# Patient Record
Sex: MALE | Race: WHITE | Employment: OTHER | URBAN - METROPOLITAN AREA
[De-identification: names, ages, dates, MRNs, and addresses within clinical notes are randomized per-mention and may not be internally consistent; named-entity substitution may affect disease eponyms.]

---

## 2017-01-12 ENCOUNTER — TELEPHONE (OUTPATIENT)
Dept: CARDIOLOGY CLINIC | Age: 72
End: 2017-01-12

## 2017-01-18 ENCOUNTER — OFFICE VISIT (OUTPATIENT)
Dept: CARDIOLOGY CLINIC | Age: 72
End: 2017-01-18

## 2017-01-18 VITALS
HEIGHT: 66 IN | HEART RATE: 100 BPM | DIASTOLIC BLOOD PRESSURE: 72 MMHG | BODY MASS INDEX: 31.66 KG/M2 | WEIGHT: 197 LBS | SYSTOLIC BLOOD PRESSURE: 114 MMHG

## 2017-01-18 DIAGNOSIS — I48.19 PERSISTENT ATRIAL FIBRILLATION (HCC): ICD-10-CM

## 2017-01-18 DIAGNOSIS — Z86.79 S/P ABLATION OF ATRIAL FIBRILLATION: ICD-10-CM

## 2017-01-18 DIAGNOSIS — Z98.890 S/P ABLATION OF ATRIAL FIBRILLATION: ICD-10-CM

## 2017-01-18 PROCEDURE — 99213 OFFICE O/P EST LOW 20 MIN: CPT | Performed by: INTERNAL MEDICINE

## 2017-01-18 RX ORDER — DILTIAZEM HYDROCHLORIDE 360 MG/1
CAPSULE, EXTENDED RELEASE ORAL
COMMUNITY
Start: 2016-12-28 | End: 2017-10-25 | Stop reason: SDUPTHER

## 2017-02-07 ENCOUNTER — TELEPHONE (OUTPATIENT)
Dept: CARDIOLOGY CLINIC | Age: 72
End: 2017-02-07

## 2017-02-08 DIAGNOSIS — Z98.890 S/P ABLATION OF ATRIAL FIBRILLATION: ICD-10-CM

## 2017-02-08 DIAGNOSIS — Z86.79 S/P ABLATION OF ATRIAL FIBRILLATION: ICD-10-CM

## 2017-02-08 DIAGNOSIS — I48.19 PERSISTENT ATRIAL FIBRILLATION (HCC): ICD-10-CM

## 2017-02-09 ENCOUNTER — TELEPHONE (OUTPATIENT)
Dept: CARDIOLOGY CLINIC | Age: 72
End: 2017-02-09

## 2017-02-09 DIAGNOSIS — Z98.890 S/P ABLATION OF ATRIAL FIBRILLATION: ICD-10-CM

## 2017-02-09 DIAGNOSIS — I48.19 PERSISTENT ATRIAL FIBRILLATION (HCC): ICD-10-CM

## 2017-02-09 DIAGNOSIS — Z86.79 S/P ABLATION OF ATRIAL FIBRILLATION: ICD-10-CM

## 2017-03-17 RX ORDER — PRAVASTATIN SODIUM 20 MG
20 TABLET ORAL DAILY
Qty: 30 TABLET | Refills: 6 | Status: SHIPPED | OUTPATIENT
Start: 2017-03-17 | End: 2017-11-10 | Stop reason: SDUPTHER

## 2017-03-17 RX ORDER — LISINOPRIL 5 MG/1
5 TABLET ORAL DAILY
Qty: 30 TABLET | Refills: 6 | Status: SHIPPED | OUTPATIENT
Start: 2017-03-17 | End: 2019-04-15 | Stop reason: ALTCHOICE

## 2017-04-04 DIAGNOSIS — I48.19 PERSISTENT ATRIAL FIBRILLATION (HCC): ICD-10-CM

## 2017-04-04 DIAGNOSIS — Z98.890 S/P ABLATION OF ATRIAL FIBRILLATION: ICD-10-CM

## 2017-04-04 DIAGNOSIS — Z86.79 S/P ABLATION OF ATRIAL FIBRILLATION: ICD-10-CM

## 2017-05-03 ENCOUNTER — TELEPHONE (OUTPATIENT)
Dept: CARDIOLOGY CLINIC | Age: 72
End: 2017-05-03

## 2017-05-03 DIAGNOSIS — Z98.890 S/P ABLATION OF ATRIAL FIBRILLATION: ICD-10-CM

## 2017-05-03 DIAGNOSIS — Z86.79 S/P ABLATION OF ATRIAL FIBRILLATION: ICD-10-CM

## 2017-05-03 DIAGNOSIS — I48.19 PERSISTENT ATRIAL FIBRILLATION (HCC): ICD-10-CM

## 2017-05-31 DIAGNOSIS — Z98.890 S/P ABLATION OF ATRIAL FIBRILLATION: ICD-10-CM

## 2017-05-31 DIAGNOSIS — Z86.79 S/P ABLATION OF ATRIAL FIBRILLATION: ICD-10-CM

## 2017-05-31 DIAGNOSIS — I48.19 PERSISTENT ATRIAL FIBRILLATION (HCC): ICD-10-CM

## 2017-06-28 ENCOUNTER — TELEPHONE (OUTPATIENT)
Dept: CARDIOLOGY CLINIC | Age: 72
End: 2017-06-28

## 2017-06-28 DIAGNOSIS — I48.19 PERSISTENT ATRIAL FIBRILLATION (HCC): ICD-10-CM

## 2017-06-28 DIAGNOSIS — Z98.890 S/P ABLATION OF ATRIAL FIBRILLATION: ICD-10-CM

## 2017-06-28 DIAGNOSIS — Z86.79 S/P ABLATION OF ATRIAL FIBRILLATION: ICD-10-CM

## 2017-07-27 DIAGNOSIS — Z98.890 S/P ABLATION OF ATRIAL FIBRILLATION: ICD-10-CM

## 2017-07-27 DIAGNOSIS — I48.19 PERSISTENT ATRIAL FIBRILLATION (HCC): ICD-10-CM

## 2017-07-27 DIAGNOSIS — Z86.79 S/P ABLATION OF ATRIAL FIBRILLATION: ICD-10-CM

## 2017-08-23 ENCOUNTER — TELEPHONE (OUTPATIENT)
Dept: CARDIOLOGY CLINIC | Age: 72
End: 2017-08-23

## 2017-08-23 DIAGNOSIS — Z98.890 S/P ABLATION OF ATRIAL FIBRILLATION: ICD-10-CM

## 2017-08-23 DIAGNOSIS — I48.19 PERSISTENT ATRIAL FIBRILLATION (HCC): ICD-10-CM

## 2017-08-23 DIAGNOSIS — Z86.79 S/P ABLATION OF ATRIAL FIBRILLATION: ICD-10-CM

## 2017-09-22 ENCOUNTER — TELEPHONE (OUTPATIENT)
Dept: CARDIOLOGY CLINIC | Age: 72
End: 2017-09-22

## 2017-09-22 DIAGNOSIS — Z86.79 S/P ABLATION OF ATRIAL FIBRILLATION: ICD-10-CM

## 2017-09-22 DIAGNOSIS — Z98.890 S/P ABLATION OF ATRIAL FIBRILLATION: ICD-10-CM

## 2017-09-22 DIAGNOSIS — I48.19 PERSISTENT ATRIAL FIBRILLATION (HCC): ICD-10-CM

## 2017-10-25 ENCOUNTER — OFFICE VISIT (OUTPATIENT)
Dept: CARDIOLOGY CLINIC | Age: 72
End: 2017-10-25

## 2017-10-25 VITALS
SYSTOLIC BLOOD PRESSURE: 132 MMHG | DIASTOLIC BLOOD PRESSURE: 82 MMHG | BODY MASS INDEX: 30.63 KG/M2 | HEIGHT: 66 IN | WEIGHT: 190.6 LBS | HEART RATE: 88 BPM

## 2017-10-25 DIAGNOSIS — Z98.890 S/P ABLATION OF ATRIAL FIBRILLATION: Primary | ICD-10-CM

## 2017-10-25 DIAGNOSIS — I48.19 PERSISTENT ATRIAL FIBRILLATION (HCC): ICD-10-CM

## 2017-10-25 DIAGNOSIS — Z98.890 S/P ABLATION OF ATRIAL FIBRILLATION: ICD-10-CM

## 2017-10-25 DIAGNOSIS — Z86.79 S/P ABLATION OF ATRIAL FIBRILLATION: ICD-10-CM

## 2017-10-25 DIAGNOSIS — Z86.79 S/P ABLATION OF ATRIAL FIBRILLATION: Primary | ICD-10-CM

## 2017-10-25 PROCEDURE — 99213 OFFICE O/P EST LOW 20 MIN: CPT | Performed by: INTERNAL MEDICINE

## 2017-10-25 NOTE — PROGRESS NOTES
CARDIOLOGY NOTE      10/25/2017    RE: Paulino Curry  (1945)                               TO:  Dr. Reed Roth MD      Thank you for involving me in taking care of your  patient Paulino Curry, who is a  67y.o. year old      male with past medical  history of  HTN, hyperlipidimea, SP ablation for  afib  is  seen today Patient  during this  visit has no cardiac complains. Has RLE varicose veins. .      Vitals:    10/25/17 1534   BP: 132/82   Pulse: 88       Current Outpatient Prescriptions   Medication Sig Dispense Refill    apixaban (ELIQUIS) 5 MG TABS tablet Take one tablet twice daily 56 tablet 0    pravastatin (PRAVACHOL) 20 MG tablet Take 1 tablet by mouth daily 30 tablet 6    lisinopril (PRINIVIL;ZESTRIL) 5 MG tablet Take 1 tablet by mouth daily 30 tablet 6    traMADol-acetaminophen (ULTRACET) 37.5-325 MG per tablet 1 tablet every 4 hours as needed       diltiazem (TIAZAC) 360 MG extended release capsule Take 1 capsule by mouth daily 30 capsule 11    omeprazole (PRILOSEC) 40 MG capsule Take 40 mg by mouth daily      ferrous sulfate 325 (65 FE) MG tablet Take 325 mg by mouth daily (with breakfast)      gabapentin (NEURONTIN) 100 MG capsule Take 100 mg by mouth nightly as needed       UNABLE TO FIND Venancio B-150      diclofenac sodium 1 % GEL Apply 2 g topically 2 times daily      hydrochlorothiazide (MICROZIDE) 12.5 MG capsule Take 12.5 mg by mouth daily.  multivitamin (THERAGRAN) per tablet Take 1 tablet by mouth daily.  vitamin E 400 UNIT capsule Take 400 Units by mouth daily.  vitamin B-12 (CYANOCOBALAMIN) 100 MCG tablet Take 50 mcg by mouth daily.  celecoxib (CELEBREX) 200 MG capsule Take 200 mg by mouth 2 times daily.  levothyroxine (SYNTHROID) 100 MCG tablet Take 100 mcg by mouth daily.  aspirin 81 MG chewable tablet Take 81 mg by mouth daily. No current facility-administered medications for this visit.       Allergies: no apparent distress. Head unremarkable  Eyes  Not injected conjunctiva  ENT  normal mucosa  Neck - Supple. No jugular venous distention noted. No carotid bruits. Cardiovascular  Normal S1 and S2 without obvious murmur or gallop. Extremities - No cyanosis, clubbing, or significant edema. Has RLE varricose veins    Pulmonary  No respiratory distress. No wheezes or rales. Pulses: Bilateral radial and pedal pulses normal  Abdomen  no tenderness  Musculoskeletal  normal strength  Neurologic    There are  no gross focal neurologic abnormalities. Skin-  No rash  Affect; normal mood    DATA:  Lab Results   Component Value Date    TROPONINI 0.050 03/23/2012     BNP:  No results found for: BNP  PT/INR:  No results found for: PTINR  No results found for: LABA1C  No results found for: CHOL, TRIG, HDL, LDLCALC, LDLDIRECT  Lab Results   Component Value Date    ALT 23 06/23/2012    AST 30 06/23/2012     TSH:  No results found for: TSH      QUALITY MEASURES:  CAD:  No   CHOL LOWERING:  No- if No Why  ANTIPLATELET:  No - if No why  BETA BLOCKER    no  IF  NO WHY  SMOKING HISTORY no COUNSELLED no  ATRIAL FIBRILLATIONno ANTICOAG: no    Assessment/ Plan:     Patient seen , interviewed and examined      -  Hypertension: Patients blood pressure is stable. Patient is advised about low sodium diet. Present medical regimen will not be changed. -  LIPID MANAGEMENT:  Available lipid  lab data reviewed  and patient was given dietary advice. NCEP- ATP III guidelines reviewed with patient. -   Changes  in medicines made:  No             - A fib    SP ablation    On meds    - RLE varicose , surg cons offered

## 2017-11-10 RX ORDER — PRAVASTATIN SODIUM 20 MG
20 TABLET ORAL DAILY
Qty: 30 TABLET | Refills: 5 | Status: SHIPPED | OUTPATIENT
Start: 2017-11-10 | End: 2018-05-09 | Stop reason: SDUPTHER

## 2017-11-20 ENCOUNTER — TELEPHONE (OUTPATIENT)
Dept: CARDIOLOGY CLINIC | Age: 72
End: 2017-11-20

## 2017-11-20 DIAGNOSIS — Z86.79 S/P ABLATION OF ATRIAL FIBRILLATION: ICD-10-CM

## 2017-11-20 DIAGNOSIS — I48.19 PERSISTENT ATRIAL FIBRILLATION (HCC): ICD-10-CM

## 2017-11-20 DIAGNOSIS — Z98.890 S/P ABLATION OF ATRIAL FIBRILLATION: ICD-10-CM

## 2017-12-07 ENCOUNTER — TELEPHONE (OUTPATIENT)
Dept: CARDIOLOGY CLINIC | Age: 72
End: 2017-12-07

## 2017-12-07 NOTE — TELEPHONE ENCOUNTER
Kayla needs pt to be off eliquis for procedure for pt . Procedure scheduled 12/11. Please call kayla or fax letter. She will fax request to our office.

## 2017-12-07 NOTE — TELEPHONE ENCOUNTER
Patient called checking on holding eliquis prior to procedure. Advised patient that the doctor has called to check on holding eliquis.

## 2017-12-08 NOTE — TELEPHONE ENCOUNTER
Patient advised that per Dr. Bonnie Jamison has OK'd him to hold his Eliquis for 48 hours for EGD. Left message on voicemail of Andrew at  to advise.

## 2017-12-21 RX ORDER — DILTIAZEM HYDROCHLORIDE 360 MG/1
360 CAPSULE, EXTENDED RELEASE ORAL DAILY
Qty: 30 CAPSULE | Refills: 10 | Status: SHIPPED | OUTPATIENT
Start: 2017-12-21 | End: 2018-11-14 | Stop reason: SDUPTHER

## 2017-12-27 ENCOUNTER — TELEPHONE (OUTPATIENT)
Dept: CARDIOLOGY CLINIC | Age: 72
End: 2017-12-27

## 2017-12-28 DIAGNOSIS — Z86.79 S/P ABLATION OF ATRIAL FIBRILLATION: ICD-10-CM

## 2017-12-28 DIAGNOSIS — I48.19 PERSISTENT ATRIAL FIBRILLATION (HCC): ICD-10-CM

## 2017-12-28 DIAGNOSIS — Z98.890 S/P ABLATION OF ATRIAL FIBRILLATION: ICD-10-CM

## 2018-01-24 ENCOUNTER — TELEPHONE (OUTPATIENT)
Dept: CARDIOLOGY CLINIC | Age: 73
End: 2018-01-24

## 2018-01-24 DIAGNOSIS — I48.19 PERSISTENT ATRIAL FIBRILLATION (HCC): ICD-10-CM

## 2018-01-24 DIAGNOSIS — Z86.79 S/P ABLATION OF ATRIAL FIBRILLATION: ICD-10-CM

## 2018-01-24 DIAGNOSIS — Z98.890 S/P ABLATION OF ATRIAL FIBRILLATION: ICD-10-CM

## 2018-02-21 DIAGNOSIS — I48.19 PERSISTENT ATRIAL FIBRILLATION (HCC): ICD-10-CM

## 2018-02-21 DIAGNOSIS — Z98.890 S/P ABLATION OF ATRIAL FIBRILLATION: ICD-10-CM

## 2018-02-21 DIAGNOSIS — Z86.79 S/P ABLATION OF ATRIAL FIBRILLATION: ICD-10-CM

## 2018-03-26 ENCOUNTER — TELEPHONE (OUTPATIENT)
Dept: CARDIOLOGY CLINIC | Age: 73
End: 2018-03-26

## 2018-03-26 DIAGNOSIS — I48.19 PERSISTENT ATRIAL FIBRILLATION (HCC): ICD-10-CM

## 2018-03-26 DIAGNOSIS — Z86.79 S/P ABLATION OF ATRIAL FIBRILLATION: ICD-10-CM

## 2018-03-26 DIAGNOSIS — Z98.890 S/P ABLATION OF ATRIAL FIBRILLATION: ICD-10-CM

## 2018-04-17 DIAGNOSIS — Z86.79 S/P ABLATION OF ATRIAL FIBRILLATION: ICD-10-CM

## 2018-04-17 DIAGNOSIS — Z98.890 S/P ABLATION OF ATRIAL FIBRILLATION: ICD-10-CM

## 2018-04-17 DIAGNOSIS — I48.19 PERSISTENT ATRIAL FIBRILLATION (HCC): ICD-10-CM

## 2018-05-09 RX ORDER — PRAVASTATIN SODIUM 20 MG
20 TABLET ORAL DAILY
Qty: 30 TABLET | Refills: 6 | Status: SHIPPED | OUTPATIENT
Start: 2018-05-09 | End: 2018-12-11 | Stop reason: SDUPTHER

## 2018-05-17 DIAGNOSIS — Z86.79 S/P ABLATION OF ATRIAL FIBRILLATION: ICD-10-CM

## 2018-05-17 DIAGNOSIS — Z98.890 S/P ABLATION OF ATRIAL FIBRILLATION: ICD-10-CM

## 2018-05-17 DIAGNOSIS — I48.19 PERSISTENT ATRIAL FIBRILLATION (HCC): ICD-10-CM

## 2018-06-14 DIAGNOSIS — Z86.79 S/P ABLATION OF ATRIAL FIBRILLATION: ICD-10-CM

## 2018-06-14 DIAGNOSIS — Z98.890 S/P ABLATION OF ATRIAL FIBRILLATION: ICD-10-CM

## 2018-06-14 DIAGNOSIS — I48.19 PERSISTENT ATRIAL FIBRILLATION (HCC): ICD-10-CM

## 2018-07-10 ENCOUNTER — TELEPHONE (OUTPATIENT)
Dept: CARDIOLOGY CLINIC | Age: 73
End: 2018-07-10

## 2018-07-10 NOTE — TELEPHONE ENCOUNTER
Patient calling for eliquis 5 mg samples. Patient states he has several days worth left. Patient will  7/11/18 in afternoon unless he hears differently from office.

## 2018-07-11 DIAGNOSIS — I48.19 PERSISTENT ATRIAL FIBRILLATION (HCC): ICD-10-CM

## 2018-07-11 DIAGNOSIS — Z98.890 S/P ABLATION OF ATRIAL FIBRILLATION: ICD-10-CM

## 2018-07-11 DIAGNOSIS — Z86.79 S/P ABLATION OF ATRIAL FIBRILLATION: ICD-10-CM

## 2018-07-24 ENCOUNTER — OFFICE VISIT (OUTPATIENT)
Dept: CARDIOLOGY CLINIC | Age: 73
End: 2018-07-24

## 2018-07-24 VITALS
DIASTOLIC BLOOD PRESSURE: 78 MMHG | WEIGHT: 184.4 LBS | SYSTOLIC BLOOD PRESSURE: 124 MMHG | RESPIRATION RATE: 16 BRPM | HEART RATE: 78 BPM | BODY MASS INDEX: 29.76 KG/M2 | OXYGEN SATURATION: 91 %

## 2018-07-24 DIAGNOSIS — E78.5 HYPERLIPIDEMIA, UNSPECIFIED HYPERLIPIDEMIA TYPE: ICD-10-CM

## 2018-07-24 DIAGNOSIS — I48.19 PERSISTENT ATRIAL FIBRILLATION (HCC): Primary | ICD-10-CM

## 2018-07-24 DIAGNOSIS — I72.9 ANEURYSM (HCC): ICD-10-CM

## 2018-07-24 PROCEDURE — 99214 OFFICE O/P EST MOD 30 MIN: CPT | Performed by: NURSE PRACTITIONER

## 2018-07-24 NOTE — PROGRESS NOTES
CARDIOLOGY  NOTE      7/24/2018    RE: Hiren Patrick  (1945)                               TO:  Dr. Ron Miles MD      Thank you for involving me in taking care of your  patient Hiren Patrick, who is a  75y.o. year old male with past medical  history of  At fib,s/p ablation  hyperlipidemia and aneurysm. He is seen today for follow up. He during this visit denies any complaints. Vitals:    07/24/18 1409   BP: 124/78   Pulse: 78   Resp: 16   SpO2: 91%       Current Outpatient Prescriptions   Medication Sig Dispense Refill    apixaban (ELIQUIS) 5 MG TABS tablet Take one tablet twice daily 56 tablet 0    pravastatin (PRAVACHOL) 20 MG tablet Take 1 tablet by mouth daily 30 tablet 6    diltiazem (CARDIZEM CD) 360 MG extended release capsule Take 1 capsule by mouth daily 30 capsule 10    lisinopril (PRINIVIL;ZESTRIL) 5 MG tablet Take 1 tablet by mouth daily 30 tablet 6    traMADol-acetaminophen (ULTRACET) 37.5-325 MG per tablet 1 tablet every 4 hours as needed       omeprazole (PRILOSEC) 40 MG capsule Take 40 mg by mouth daily      ferrous sulfate 325 (65 FE) MG tablet Take 325 mg by mouth daily (with breakfast)      gabapentin (NEURONTIN) 100 MG capsule Take 100 mg by mouth nightly as needed       UNABLE TO FIND Venancio B-150      diclofenac sodium 1 % GEL Apply 2 g topically 2 times daily      hydrochlorothiazide (MICROZIDE) 12.5 MG capsule Take 12.5 mg by mouth daily.  multivitamin (THERAGRAN) per tablet Take 1 tablet by mouth daily.  vitamin E 400 UNIT capsule Take 400 Units by mouth daily.  vitamin B-12 (CYANOCOBALAMIN) 100 MCG tablet Take 50 mcg by mouth daily.  celecoxib (CELEBREX) 200 MG capsule Take 200 mg by mouth 2 times daily.  levothyroxine (SYNTHROID) 100 MCG tablet Take 100 mcg by mouth daily.  aspirin 81 MG chewable tablet Take 81 mg by mouth daily.          No current facility-administered medications for this visit. Allergies: Codeine and Vicodin [hydrocodone-acetaminophen]  Past Medical History:   Diagnosis Date    Aneurysm ([address]     12 YEARS AGO, NON SURGICAL    Arthritis     H/O 24 hour EKG monitoring 05-    PRED. RHYTHM SR W/INFREQUENT VENT. AND SUPRAVENT ECTOPY W/SHORT RUNS OF SVT. LONG RUN 10 BEATS.    H/O cardiovascular stress test 05-    [facility] [place]) NORMAL. EF 56%.  H/O cardiovascular stress test 6/6/2016    lexiscan-normal,EF51%    H/O echocardiogram 05-    EF 55%. MILD CONCENTRIC LEFT VENT. HYPERTROPHY. MILD AORTIC VALVE CALCIFICATION. MILD VALVULAR AORTIC STENOSIS. MOD. AORTIC REGURG.  MILD AORTIC ROOT DILATION.   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    Thyroid disease     HYPOTHYROIDISM     Past Surgical History:   Procedure Laterality Date    ATRIAL ABLATION SURGERY N/A 08/16/2016    atrial fibrillation ablation    BRAIN ANEURYSM SURGERY      has coil    CATARACT REMOVAL  2012    HERNIA REPAIR      LEFT    KNEE SURGERY  1977    knee  left    KNEE SURGERY      VITRECTOMY  05-    LEFT EYE      Family History   Problem Relation Age of Onset    High Blood Pressure Mother      Social History   Substance Use Topics    Smoking status: Never Smoker    Smokeless tobacco: Never Used      Comment: reviewed    Alcohol use No          Review of systems:  HEENT: Neg  Card:neg   GI;Neg  : Neg  Neuro: Neg  Psych: Neg  Derm: Neg  MS; Neg  All: Documented  Constitutional: Neg    Objective:      Physical Exam:  /78   Pulse 78   Resp 16   Wt 184 lb 6.4 oz (83.6 kg)   SpO2 91%   BMI 29.76 kg/m²   Wt Readings from Last 3 Encounters:   07/24/18 184 lb 6.4 oz (83.6 kg)   10/25/17 190 lb 9.6 oz (86.5 kg)   01/18/17 197 lb (89.4 kg)     Body mass index is 29.76 kg/m². GENERAL - Alert, oriented, pleasant, in no apparent distress. Head unremarkable  Eyes  Not injected conjunctiva  ENT  normal mucosa  Neck - Supple. No jugular venous distention noted. No carotid bruits. Cardiovascular  Normal S1 and S2 3/6 systolic murmur  murmur or gallop. Extremities - No cyanosis, clubbing, or significant edema. Large varicosities   Pulmonary  No respiratory distress. No wheezes or rales. Pulses: Bilateral radial and pedal pulses normal  Abdomen  no tenderness  Musculoskeletal  normal strength  Neurologic    There are  no gross focal neurologic abnormalities. Skin-  No rash  Affect; normal mood    DATA:  Lab Results   Component Value Date    TROPONINI 0.050 03/23/2012     BNP:  No results found for: BNP  PT/INR:  No results found for: PTINR  No results found for: LABA1C  No results found for: CHOL, TRIG, HDL, LDLCALC, LDLDIRECT  Lab Results   Component Value Date    ALT 23 06/23/2012    AST 30 06/23/2012     TSH:  No results found for: TSH        Assessment/ Plan:     Patient seen , interviewed and examined     Atrial fib- s/p ablation  - on eliquis -cardizem  Rate controlled rhythm regular. hypothyroid taking synthroid  Followed with pcp   Hyperlipidemia- on pravastatin   Labs with PCP  given dietary advice. NCEP- ATP III guidelines reviewed with patient.   HTN- stable  Cont with present meds   Low salt diet       F/u 9 mo   Cont present management  No testing needed at this time

## 2018-08-13 DIAGNOSIS — Z86.79 S/P ABLATION OF ATRIAL FIBRILLATION: ICD-10-CM

## 2018-08-13 DIAGNOSIS — Z98.890 S/P ABLATION OF ATRIAL FIBRILLATION: ICD-10-CM

## 2018-08-13 DIAGNOSIS — I48.19 PERSISTENT ATRIAL FIBRILLATION (HCC): ICD-10-CM

## 2018-10-03 DIAGNOSIS — I48.19 PERSISTENT ATRIAL FIBRILLATION (HCC): ICD-10-CM

## 2018-10-03 DIAGNOSIS — Z98.890 S/P ABLATION OF ATRIAL FIBRILLATION: ICD-10-CM

## 2018-10-03 DIAGNOSIS — Z86.79 S/P ABLATION OF ATRIAL FIBRILLATION: ICD-10-CM

## 2018-10-03 NOTE — TELEPHONE ENCOUNTER
Patient called requesting 5 mg samples of Eliquis, I advised patient that if available samples should be ready for  by tomorrow afternoon, please call with any problems ph# [phone].

## 2018-10-31 DIAGNOSIS — I48.19 PERSISTENT ATRIAL FIBRILLATION (HCC): ICD-10-CM

## 2018-10-31 DIAGNOSIS — Z86.79 S/P ABLATION OF ATRIAL FIBRILLATION: ICD-10-CM

## 2018-10-31 DIAGNOSIS — Z98.890 S/P ABLATION OF ATRIAL FIBRILLATION: ICD-10-CM

## 2018-11-14 RX ORDER — DILTIAZEM HYDROCHLORIDE 360 MG/1
CAPSULE, EXTENDED RELEASE ORAL
Qty: 30 CAPSULE | Refills: 9 | Status: SHIPPED | OUTPATIENT
Start: 2018-11-14 | End: 2019-09-16 | Stop reason: SDUPTHER

## 2018-11-28 ENCOUNTER — TELEPHONE (OUTPATIENT)
Dept: CARDIOLOGY CLINIC | Age: 73
End: 2018-11-28

## 2018-11-28 DIAGNOSIS — I48.19 PERSISTENT ATRIAL FIBRILLATION (HCC): ICD-10-CM

## 2018-11-28 DIAGNOSIS — Z86.79 S/P ABLATION OF ATRIAL FIBRILLATION: ICD-10-CM

## 2018-11-28 DIAGNOSIS — Z98.890 S/P ABLATION OF ATRIAL FIBRILLATION: ICD-10-CM

## 2018-12-11 RX ORDER — PRAVASTATIN SODIUM 20 MG
20 TABLET ORAL DAILY
Qty: 30 TABLET | Refills: 5 | Status: SHIPPED | OUTPATIENT
Start: 2018-12-11 | End: 2019-06-19 | Stop reason: SDUPTHER

## 2018-12-28 DIAGNOSIS — I48.19 PERSISTENT ATRIAL FIBRILLATION (HCC): ICD-10-CM

## 2018-12-28 DIAGNOSIS — Z86.79 S/P ABLATION OF ATRIAL FIBRILLATION: ICD-10-CM

## 2018-12-28 DIAGNOSIS — Z98.890 S/P ABLATION OF ATRIAL FIBRILLATION: ICD-10-CM

## 2019-01-24 DIAGNOSIS — I48.19 PERSISTENT ATRIAL FIBRILLATION (HCC): ICD-10-CM

## 2019-01-24 DIAGNOSIS — Z98.890 S/P ABLATION OF ATRIAL FIBRILLATION: ICD-10-CM

## 2019-01-24 DIAGNOSIS — Z86.79 S/P ABLATION OF ATRIAL FIBRILLATION: ICD-10-CM

## 2019-02-21 DIAGNOSIS — Z98.890 S/P ABLATION OF ATRIAL FIBRILLATION: ICD-10-CM

## 2019-02-21 DIAGNOSIS — I48.19 PERSISTENT ATRIAL FIBRILLATION (HCC): ICD-10-CM

## 2019-02-21 DIAGNOSIS — Z86.79 S/P ABLATION OF ATRIAL FIBRILLATION: ICD-10-CM

## 2019-03-20 ENCOUNTER — TELEPHONE (OUTPATIENT)
Dept: CARDIOLOGY CLINIC | Age: 74
End: 2019-03-20

## 2019-03-20 DIAGNOSIS — I48.19 PERSISTENT ATRIAL FIBRILLATION (HCC): ICD-10-CM

## 2019-03-20 DIAGNOSIS — Z86.79 S/P ABLATION OF ATRIAL FIBRILLATION: ICD-10-CM

## 2019-03-20 DIAGNOSIS — Z98.890 S/P ABLATION OF ATRIAL FIBRILLATION: ICD-10-CM

## 2019-04-09 LAB
ALBUMIN SERPL-MCNC: 3.7 G/DL
ALP BLD-CCNC: 78 U/L
ALT SERPL-CCNC: 16 U/L
ANION GAP SERPL CALCULATED.3IONS-SCNC: NORMAL MMOL/L
AST SERPL-CCNC: 24 U/L
BASOPHILS ABSOLUTE: 0.1 /ΜL
BASOPHILS RELATIVE PERCENT: 0.8 %
BILIRUB SERPL-MCNC: 0.3 MG/DL (ref 0.1–1.4)
BUN BLDV-MCNC: 37 MG/DL
CALCIUM SERPL-MCNC: 9.2 MG/DL
CHLORIDE BLD-SCNC: 107 MMOL/L
CHOLESTEROL, TOTAL: 103 MG/DL
CHOLESTEROL/HDL RATIO: NORMAL
CO2: 22 MMOL/L
CREAT SERPL-MCNC: 1 MG/DL
EOSINOPHILS ABSOLUTE: 0.4 /ΜL
EOSINOPHILS RELATIVE PERCENT: 5.9 %
GFR CALCULATED: NORMAL
GLUCOSE BLD-MCNC: 125 MG/DL
HCT VFR BLD CALC: 36.2 % (ref 41–53)
HDLC SERPL-MCNC: 37 MG/DL (ref 35–70)
HEMOGLOBIN: 12.4 G/DL (ref 13.5–17.5)
LDL CHOLESTEROL CALCULATED: 50 MG/DL (ref 0–160)
LYMPHOCYTES ABSOLUTE: 1.1 /ΜL
LYMPHOCYTES RELATIVE PERCENT: 15.1 %
MCH RBC QN AUTO: ABNORMAL PG
MCHC RBC AUTO-ENTMCNC: ABNORMAL G/DL
MCV RBC AUTO: ABNORMAL FL
MONOCYTES ABSOLUTE: 1.2 /ΜL
MONOCYTES RELATIVE PERCENT: 16.1 %
NEUTROPHILS ABSOLUTE: 4.5 /ΜL
NEUTROPHILS RELATIVE PERCENT: 62.1 %
PLATELET # BLD: 297 K/ΜL
PMV BLD AUTO: ABNORMAL FL
POTASSIUM SERPL-SCNC: 5 MMOL/L
RBC # BLD: 3.75 10^6/ΜL
SODIUM BLD-SCNC: 143 MMOL/L
TOTAL PROTEIN: 6.2
TRIGL SERPL-MCNC: 82 MG/DL
TSH SERPL DL<=0.05 MIU/L-ACNC: 0.72 UIU/ML
VLDLC SERPL CALC-MCNC: 16 MG/DL
WBC # BLD: 7.2 10^3/ML

## 2019-04-15 ENCOUNTER — TELEPHONE (OUTPATIENT)
Dept: CARDIOLOGY CLINIC | Age: 74
End: 2019-04-15

## 2019-04-15 ENCOUNTER — OFFICE VISIT (OUTPATIENT)
Dept: CARDIOLOGY CLINIC | Age: 74
End: 2019-04-15
Payer: MEDICARE

## 2019-04-15 VITALS
HEIGHT: 66 IN | BODY MASS INDEX: 28.83 KG/M2 | WEIGHT: 179.4 LBS | DIASTOLIC BLOOD PRESSURE: 60 MMHG | SYSTOLIC BLOOD PRESSURE: 114 MMHG | HEART RATE: 140 BPM

## 2019-04-15 DIAGNOSIS — R06.02 SHORTNESS OF BREATH: Primary | ICD-10-CM

## 2019-04-15 DIAGNOSIS — I48.19 PERSISTENT ATRIAL FIBRILLATION (HCC): ICD-10-CM

## 2019-04-15 DIAGNOSIS — E78.5 HYPERLIPIDEMIA, UNSPECIFIED HYPERLIPIDEMIA TYPE: ICD-10-CM

## 2019-04-15 PROCEDURE — 93000 ELECTROCARDIOGRAM COMPLETE: CPT | Performed by: NURSE PRACTITIONER

## 2019-04-15 PROCEDURE — 99213 OFFICE O/P EST LOW 20 MIN: CPT | Performed by: NURSE PRACTITIONER

## 2019-04-15 RX ORDER — METOPROLOL SUCCINATE 25 MG/1
12.5 TABLET, EXTENDED RELEASE ORAL DAILY
Qty: 15 TABLET | Refills: 0 | Status: SHIPPED | OUTPATIENT
Start: 2019-04-15 | End: 2019-05-22 | Stop reason: ALTCHOICE

## 2019-04-15 NOTE — PROGRESS NOTES
[facility] PHYSICAL REHABILITATION [place]  [address], [address]  Phone: [phone]    Fax [phone]                  Brian Blum MD, Deneen Johnson MD, [address], MD, Ema Savana, MD Lizbeth Tapia, MD Diana Adair, NAVNEET Russell, NAVNEET Escobar    CARDIOLOGY  NOTE      4/15/2019    RE: Moreno Trevino  (1945)                               TO:  Dr. Sandrita Nolasco MD  The primary cardiologist is Dr. Lakeshia Dennison    CC:  Patient denies all of the following:  Chest Pain  Palpitations  Shortness of Breath  Edema  Dizziness  Syncope      HPI: Thank you for involving me in taking care of your patient Moreno Trevino, who is a  75y.o. year old male with a history of ***. Vitals:    04/15/19 1358   BP: 114/60   Pulse: 140       Current Outpatient Medications   Medication Sig Dispense Refill    metoprolol succinate (TOPROL XL) 25 MG extended release tablet Take 0.5 tablets by mouth daily 15 tablet 0    apixaban (ELIQUIS) 5 MG TABS tablet Take one tablet twice daily 56 tablet 0    pravastatin (PRAVACHOL) 20 MG tablet Take 1 tablet by mouth daily 30 tablet 5    diltiazem (CARDIZEM CD) 360 MG extended release capsule TAKE ONE (1) CAPSULE BY MOUTH ONCE DAILY 30 capsule 9    traMADol-acetaminophen (ULTRACET) 37.5-325 MG per tablet 1 tablet every 4 hours as needed       omeprazole (PRILOSEC) 40 MG capsule Take 40 mg by mouth daily      ferrous sulfate 325 (65 FE) MG tablet Take 325 mg by mouth daily (with breakfast)      gabapentin (NEURONTIN) 100 MG capsule Take 100 mg by mouth nightly as needed       UNABLE TO FIND Venancio B-150      diclofenac sodium 1 % GEL Apply 2 g topically 2 times daily      multivitamin (THERAGRAN) per tablet Take 1 tablet by mouth daily.  vitamin E 400 UNIT capsule Take 400 Units by mouth daily.  vitamin B-12 (CYANOCOBALAMIN) 100 MCG tablet Take 50 mcg by mouth daily.  celecoxib (CELEBREX) 200 MG capsule Take 200 mg by mouth 2 times daily.  levothyroxine (SYNTHROID) 100 MCG tablet Take 100 mcg by mouth daily.  aspirin 81 MG chewable tablet Take 81 mg by mouth daily.  lisinopril (PRINIVIL;ZESTRIL) 5 MG tablet Take 1 tablet by mouth daily 30 tablet 6    hydrochlorothiazide (MICROZIDE) 12.5 MG capsule Take 12.5 mg by mouth daily. No current facility-administered medications for this visit. Allergies: Codeine and Vicodin [hydrocodone-acetaminophen]  Past Medical History:   Diagnosis Date    Aneurysm ([facility] [address]     12 YEARS AGO, NON SURGICAL    Arthritis     H/O 24 hour EKG monitoring 05-    PRED. RHYTHM SR W/INFREQUENT VENT. AND SUPRAVENT ECTOPY W/SHORT RUNS OF SVT. LONG RUN 10 BEATS.    H/O cardiovascular stress test 05-    [facility]) NORMAL. EF 56%.  H/O cardiovascular stress test 6/6/2016    lexiscan-normal,EF51%    H/O echocardiogram 05-    EF 55%. MILD CONCENTRIC LEFT VENT. HYPERTROPHY. MILD AORTIC VALVE CALCIFICATION. MILD VALVULAR AORTIC STENOSIS. MOD. AORTIC REGURG.  MILD AORTIC ROOT DILATION.   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    Thyroid disease     HYPOTHYROIDISM     Past Surgical History:   Procedure Laterality Date    ATRIAL ABLATION SURGERY N/A 08/16/2016    atrial fibrillation ablation    BRAIN ANEURYSM SURGERY      has coil    CATARACT REMOVAL  2012    HERNIA REPAIR      LEFT    KNEE SURGERY  1977    knee  left    KNEE SURGERY      VITRECTOMY  05-    LEFT EYE      Family History   Problem Relation Age of Onset    High Blood Pressure Mother      Social History     Tobacco Use    Smoking status: Never Smoker    Smokeless tobacco: Never Used    Tobacco comment: reviewed   Substance Use Topics    Alcohol use: No        Review of Systems - History obtained from the patient  General: negative for - fatigue, malaise, night sweats, weight gain  Psychological: negative for - anxiety, depression, sleep disturbances  Ophthalmic: negative for - blurry vision, loss of vision  ENT: negative for - headaches, vertigo, visual changes  Hematological and Lymphatic: negative for - bleeding problems, blood clots, bruising, fatigue or pallor  Endocrine: negative for - malaise/lethargy, palpitations, unexpected weight changes  Respiratory: negative for - cough, orthopnea, shortness of breath or tachypnea  Cardiovascular: negative for - chest pain, dyspnea on exertion, edema or palpitations  Gastrointestinal: no abdominal pain, change in bowel habits, or black or bloody stools  Genito-Urinary: no dysuria, trouble voiding, or hematuria  Musculoskeletal: negative for - gait disturbance, pain in {body parts:261683}, swelling in {body parts:452053}   Neurological: negative for - confusion, dizziness, impaired coordination/balance or numbness/tingling  ***    Objective:      Physical Exam:  /60 (Site: Left Upper Arm, Position: Sitting, Cuff Size: Medium Adult)   Pulse 140   Ht 5' 6\" (1.676 m)   Wt 179 lb 6.4 oz (81.4 kg)   BMI 28.96 kg/m²   Wt Readings from Last 3 Encounters:   04/15/19 179 lb 6.4 oz (81.4 kg)   07/24/18 184 lb 6.4 oz (83.6 kg)   10/25/17 190 lb 9.6 oz (86.5 kg)     Body mass index is 28.96 kg/m². GENERAL - Alert, oriented, pleasant, in no apparent distress. Head unremarkable  Eyes - pupils equal and reactive to light - bilateral conjunctiva are pink: sclera are white   ENT - external ears intact, nose is intact:  tongue is midline pink and moist  Neck - Supple. No jugular venous distention noted. {YES/NO:19726} carotid bruits appreciated. Cardiovascular - Normal S1 and S2:  murmur appreciated {YES/NO:19732}, No gallop. Regular rate- {YES/NO:19732},  rhythm regular-{YES/NO:19732}. Extremities - No cyanosis, clubbing, *** edema to lower legs. Pulmonary - No respiratory distress. No wheezes or rales. Chest is ***  Pulses: Bilateral radial and pedal pulses normal  Abdomen -  Soft no tenderness, non distended   Musculoskeletal - Normal movement of all extremities ***  Neurologic - alert and oriented: There are no gross focal neurologic abnormalities. Skin-  No rash: {YES/NO:19732} echymosis   Affect- normal mood and pleasant       DATA:  Lab Results   Component Value Date    TROPONINI 0.050 03/23/2012     BNP:  No results found for: BNP  PT/INR:  No results found for: PTINR  No results found for: LABA1C  No results found for: CHOL, TRIG, HDL, LDLCALC, LDLDIRECT  Lab Results   Component Value Date    ALT 23 06/23/2012    AST 30 06/23/2012     TSH:  No results found for: TSH      Assessment/ Plan:       CAD     {STABLE/UNSTABLE:715263018}    Current meds include:   continue with medications    Last stress test ***    HTN     {Blank single:58618::\"Controlled\",\"Uncontrolled\"}   To cont same medications   advised low salt diet    Last echo *** showed EF of ***% with ***    Hyperlipidemia    Labs noted from ***  HDL ***  LDL ***     Pt is *** at goal   Patient is on statin     Atrial fib    Rate controlled {YES/NO/NA:19705}.  {He/she (caps):24921} {IS/IS [id number] on anticoagulation.  Continue medications     Patient seen, interviewed and examined. Testing was reviewed. Patient visit and plan reviewed with ***    Patient is encouraged to exercise even a brisk walk for 30 minutes at least 3 to 4 times a week. Lifestyle and risk factor modificatons discussed. Various goals are discussed and questions answered. Continue current medications. Appropriate prescriptions are addressed. Questions answered and patient verbalizes understanding. Call for any problems, questions, or concerns.     Pt is to follow up in *** months for Cardiac management    Electronically signed by NAVNEET Meza CNP on 4/15/2019 at 2:22 PM

## 2019-04-15 NOTE — PROGRESS NOTES
[address], [address] [facility],[address]  Phone: [phone]    Fax [phone]                  bAbie Quinn MD, Kami Pittman MD, [address], MD, MD Ravi Allison MD Lesa Mesi, MD Hart Engel, APRMARLON Hull, NAVNEET Donnelly, NAVNEET    CARDIOLOGY  NOTE      4/15/2019    RE: Joyce Smith  (1945)                               TO:  Dr. Angie Hale MD  The primary cardiologist is Dr. Jorge Camarillo    CC: shortness of breath, palpitations, PAF and HLD. He denies chest pain, edema, dizziness, and syncope  Patient denies all of the following:  Chest Pain  Palpitations  Shortness of Breath  Edema  Dizziness  Syncope      HPI: Thank you for involving me in taking care of your patient Joyce Smith, who is a  75y.o. year old male with a history of shortness of breath, PAF, and HLD. Vitals:    04/15/19 1358   BP: 114/60   Pulse: 140       Current Outpatient Medications   Medication Sig Dispense Refill    apixaban (ELIQUIS) 5 MG TABS tablet Take one tablet twice daily 56 tablet 0    pravastatin (PRAVACHOL) 20 MG tablet Take 1 tablet by mouth daily 30 tablet 5    diltiazem (CARDIZEM CD) 360 MG extended release capsule TAKE ONE (1) CAPSULE BY MOUTH ONCE DAILY 30 capsule 9    traMADol-acetaminophen (ULTRACET) 37.5-325 MG per tablet 1 tablet every 4 hours as needed       omeprazole (PRILOSEC) 40 MG capsule Take 40 mg by mouth daily      ferrous sulfate 325 (65 FE) MG tablet Take 325 mg by mouth daily (with breakfast)      gabapentin (NEURONTIN) 100 MG capsule Take 100 mg by mouth nightly as needed       UNABLE TO FIND Venancio B-150      diclofenac sodium 1 % GEL Apply 2 g topically 2 times daily      multivitamin (THERAGRAN) per tablet Take 1 tablet by mouth daily.  vitamin E 400 UNIT capsule Take 400 Units by mouth daily.         vitamin B-12 (CYANOCOBALAMIN) 100 MCG tablet Take 50 mcg by mouth daily.  celecoxib (CELEBREX) 200 MG capsule Take 200 mg by mouth 2 times daily.  levothyroxine (SYNTHROID) 100 MCG tablet Take 100 mcg by mouth daily.  aspirin 81 MG chewable tablet Take 81 mg by mouth daily.  lisinopril (PRINIVIL;ZESTRIL) 5 MG tablet Take 1 tablet by mouth daily 30 tablet 6    hydrochlorothiazide (MICROZIDE) 12.5 MG capsule Take 12.5 mg by mouth daily. No current facility-administered medications for this visit. Allergies: Codeine and Vicodin [hydrocodone-acetaminophen]  Past Medical History:   Diagnosis Date    Aneurysm ([facility] [address]     12 YEARS AGO, NON SURGICAL    Arthritis     H/O 24 hour EKG monitoring 05-    PRED. RHYTHM SR W/INFREQUENT VENT. AND SUPRAVENT ECTOPY W/SHORT RUNS OF SVT. LONG RUN 10 BEATS.    H/O cardiovascular stress test 05-    [facility]) NORMAL. EF 56%.  H/O cardiovascular stress test 6/6/2016    lexiscan-normal,EF51%    H/O echocardiogram 05-    EF 55%. MILD CONCENTRIC LEFT VENT. HYPERTROPHY. MILD AORTIC VALVE CALCIFICATION. MILD VALVULAR AORTIC STENOSIS. MOD. AORTIC REGURG.  MILD AORTIC ROOT DILATION.   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    Thyroid disease     HYPOTHYROIDISM     Past Surgical History:   Procedure Laterality Date    ATRIAL ABLATION SURGERY N/A 08/16/2016    atrial fibrillation ablation    BRAIN ANEURYSM SURGERY      has coil    CATARACT REMOVAL  2012    HERNIA REPAIR      LEFT    KNEE SURGERY  1977    knee  left    KNEE SURGERY      VITRECTOMY  05-    LEFT EYE      Family History   Problem Relation Age of Onset    High Blood Pressure Mother      Social History     Tobacco Use    Smoking status: Never Smoker    Smokeless tobacco: Never Used    Tobacco comment: reviewed   Substance Use Topics    Alcohol use: No        Review of Systems - History obtained from the patient  General: negative for - fatigue, malaise, night sweats, weight gain  Psychological: negative for - anxiety, depression, sleep disturbances  Ophthalmic: negative for - blurry vision, loss of vision  ENT: negative for - headaches, vertigo, visual changes  Hematological and Lymphatic: negative for - bleeding problems, blood clots, bruising, fatigue or pallor  Endocrine: negative for - malaise/lethargy, palpitations, unexpected weight changes  Respiratory: negative for - cough, orthopnea,or  Tachypnea. He feels SOB  Cardiovascular: negative for - chest pain, dyspnea on exertion, edema or palpitations  Gastrointestinal: no abdominal pain, change in bowel habits, or black or bloody stools  Genito-Urinary: no dysuria, trouble voiding, or hematuria  Musculoskeletal: negative for - gait disturbance, pain, swelling   Neurological: negative for - confusion, dizziness, impaired coordination/balance or numbness/tingling    Objective:      Physical Exam:  /60 (Site: Left Upper Arm, Position: Sitting, Cuff Size: Medium Adult)   Pulse 140   Ht 5' 6\" (1.676 m)   Wt 179 lb 6.4 oz (81.4 kg)   BMI 28.96 kg/m²   Wt Readings from Last 3 Encounters:   04/15/19 179 lb 6.4 oz (81.4 kg)   07/24/18 184 lb 6.4 oz (83.6 kg)   10/25/17 190 lb 9.6 oz (86.5 kg)     Body mass index is 28.96 kg/m². GENERAL - Alert, oriented, pleasant, in no apparent distress. Head unremarkable  Eyes - pupils equal and reactive to light - bilateral conjunctiva are pink: sclera are white   ENT - external ears intact, nose is intact:  tongue is midline pink and moist  Neck - Supple. No jugular venous distention noted. No carotid bruits appreciated. Cardiovascular - Normal S1 and S2:  murmur appreciated No, No gallop.  Regular rate- No,  rhythm regular-No.   Extremities - No cyanosis, clubbing, no edema to lower legs. Pulmonary - No respiratory distress. No wheezes or rales. Chest is diminished  Pulses: Bilateral radial and pedal pulses normal  Abdomen -  Soft no tenderness, non distended   Musculoskeletal - Normal movement of all extremities   Neurologic - alert and oriented: There are no gross focal neurologic abnormalities. Skin-  No rash: No echymosis   Affect- normal mood and pleasant       DATA:  Lab Results   Component Value Date    TROPONINI 0.050 03/23/2012     BNP:  No results found for: BNP  PT/INR:  No results found for: PTINR  No results found for: LABA1C  No results found for: CHOL, TRIG, HDL, LDLCALC, LDLDIRECT  Lab Results   Component Value Date    ALT 23 06/23/2012    AST 30 06/23/2012     TSH:  No results found for: TSH      Assessment/ Plan:     Hyperlipidemia   Labs noted from 4/9/2019  HDL 37  LDL 50     Pt is at goal   Patient is on statin     Atrial fib    Rate controlled no.  He is  on anticoagulation.  Start metoprolol    Continue medications   Echo ordered   Instructed to go to the ED if shortness of breath increases or he develops chest pain. Patient seen, interviewed and examined. Testing was reviewed. Patient is encouraged to exercise even a brisk walk for 30 minutes at least 3 to 4 times a week. Lifestyle and risk factor modificatons discussed. Various goals are discussed and questions answered. Continue current medications. Appropriate prescriptions are addressed. Questions answered and patient verbalizes understanding. Call for any problems, questions, or concerns.     Pt is to follow up in 3 months for Cardiac management    Electronically signed by NAVNEET Leos CNP on 4/15/2019 at 2:08 PM

## 2019-04-15 NOTE — TELEPHONE ENCOUNTER
Called pt to see if he can change his appt with Dr. Derrick Prasad from 04/17/2019 to 04/26/2019 at 2:15pm per [facility]

## 2019-04-15 NOTE — PROGRESS NOTES
[id number] Score for Atrial Fibrillation Stroke Risk   Risk   Factors  Component Value   C CHF No 0   H HTN No 0   A2 Age >= 76 Yes,  (68 y.o.) 2   D DM No 0   S2 Prior Stroke/TIA No 0   V Vascular Disease No 0   A Age 74-69 No,  (68 y.o.) 0   Sc Sex male 0    [id number]  Score  2   Score last updated 5/46/30 3:92 PM    Click here for a link to the UpToDate guideline \"Atrial Fibrillation: Anticoagulation therapy to prevent embolization    Disclaimer: Risk Score calculation is dependent on accuracy of patient problem list and past encounter diagnosis.

## 2019-04-16 ENCOUNTER — HOSPITAL ENCOUNTER (INPATIENT)
Age: 74
LOS: 6 days | Discharge: HOME HEALTH | DRG: 291 | End: 2019-04-22
Attending: EMERGENCY MEDICINE | Admitting: HOSPITALIST
Payer: MEDICARE

## 2019-04-16 ENCOUNTER — TELEPHONE (OUTPATIENT)
Dept: CARDIOLOGY CLINIC | Age: 74
End: 2019-04-16

## 2019-04-16 ENCOUNTER — APPOINTMENT (OUTPATIENT)
Dept: CT IMAGING | Age: 74
DRG: 291 | End: 2019-04-16
Payer: MEDICARE

## 2019-04-16 ENCOUNTER — APPOINTMENT (OUTPATIENT)
Dept: GENERAL RADIOLOGY | Age: 74
DRG: 291 | End: 2019-04-16
Payer: MEDICARE

## 2019-04-16 DIAGNOSIS — J18.9 PNEUMONIA DUE TO ORGANISM: ICD-10-CM

## 2019-04-16 DIAGNOSIS — I50.9 CONGESTIVE HEART FAILURE, UNSPECIFIED HF CHRONICITY, UNSPECIFIED HEART FAILURE TYPE (HCC): ICD-10-CM

## 2019-04-16 DIAGNOSIS — I48.91 ATRIAL FIBRILLATION, UNSPECIFIED TYPE (HCC): Primary | ICD-10-CM

## 2019-04-16 LAB
ALBUMIN SERPL-MCNC: 3.3 GM/DL (ref 3.4–5)
ALP BLD-CCNC: 87 IU/L (ref 40–129)
ALT SERPL-CCNC: 14 U/L (ref 10–40)
ANION GAP SERPL CALCULATED.3IONS-SCNC: 15 MMOL/L (ref 4–16)
APTT: 42.3 SECONDS (ref 21.2–33)
AST SERPL-CCNC: 28 IU/L (ref 15–37)
BASOPHILS ABSOLUTE: 0.1 K/CU MM
BASOPHILS RELATIVE PERCENT: 0.4 % (ref 0–1)
BILIRUB SERPL-MCNC: 0.7 MG/DL (ref 0–1)
BUN BLDV-MCNC: 21 MG/DL (ref 6–23)
CALCIUM SERPL-MCNC: 9.2 MG/DL (ref 8.3–10.6)
CHLORIDE BLD-SCNC: 109 MMOL/L (ref 99–110)
CO2: 19 MMOL/L (ref 21–32)
CREAT SERPL-MCNC: 0.8 MG/DL (ref 0.9–1.3)
DIFFERENTIAL TYPE: ABNORMAL
EOSINOPHILS ABSOLUTE: 0.2 K/CU MM
EOSINOPHILS RELATIVE PERCENT: 1.4 % (ref 0–3)
GFR AFRICAN AMERICAN: >60 ML/MIN/1.73M2
GFR NON-AFRICAN AMERICAN: >60 ML/MIN/1.73M2
GLUCOSE BLD-MCNC: 119 MG/DL (ref 70–99)
HCT VFR BLD CALC: 39.2 % (ref 42–52)
HEMOGLOBIN: 12.2 GM/DL (ref 13.5–18)
IMMATURE NEUTROPHIL %: 0.9 % (ref 0–0.43)
INR BLD: 1.79 INDEX
LYMPHOCYTES ABSOLUTE: 1.4 K/CU MM
LYMPHOCYTES RELATIVE PERCENT: 10.2 % (ref 24–44)
MAGNESIUM: 1.4 MG/DL (ref 1.8–2.4)
MAGNESIUM: 1.6 MG/DL (ref 1.8–2.4)
MCH RBC QN AUTO: 31.9 PG (ref 27–31)
MCHC RBC AUTO-ENTMCNC: 31.1 % (ref 32–36)
MCV RBC AUTO: 102.3 FL (ref 78–100)
MONOCYTES ABSOLUTE: 1.5 K/CU MM
MONOCYTES RELATIVE PERCENT: 10.5 % (ref 0–4)
NUCLEATED RBC %: 0 %
PDW BLD-RTO: 13.1 % (ref 11.7–14.9)
PLATELET # BLD: 378 K/CU MM (ref 140–440)
PMV BLD AUTO: 9.6 FL (ref 7.5–11.1)
POTASSIUM SERPL-SCNC: 4 MMOL/L (ref 3.5–5.1)
PRO-BNP: 6385 PG/ML
PROTHROMBIN TIME: 20.6 SECONDS (ref 9.12–12.5)
RBC # BLD: 3.83 M/CU MM (ref 4.6–6.2)
SEGMENTED NEUTROPHILS ABSOLUTE COUNT: 10.7 K/CU MM
SEGMENTED NEUTROPHILS RELATIVE PERCENT: 76.6 % (ref 36–66)
SODIUM BLD-SCNC: 143 MMOL/L (ref 135–145)
TOTAL IMMATURE NEUTOROPHIL: 0.13 K/CU MM
TOTAL NUCLEATED RBC: 0 K/CU MM
TOTAL PROTEIN: 7.2 GM/DL (ref 6.4–8.2)
TROPONIN T: <0.01 NG/ML
TROPONIN T: <0.01 NG/ML
WBC # BLD: 13.9 K/CU MM (ref 4–10.5)

## 2019-04-16 PROCEDURE — 80053 COMPREHEN METABOLIC PANEL: CPT

## 2019-04-16 PROCEDURE — 83880 ASSAY OF NATRIURETIC PEPTIDE: CPT

## 2019-04-16 PROCEDURE — 71275 CT ANGIOGRAPHY CHEST: CPT

## 2019-04-16 PROCEDURE — 71045 X-RAY EXAM CHEST 1 VIEW: CPT

## 2019-04-16 PROCEDURE — 87040 BLOOD CULTURE FOR BACTERIA: CPT

## 2019-04-16 PROCEDURE — 2580000003 HC RX 258: Performed by: EMERGENCY MEDICINE

## 2019-04-16 PROCEDURE — 6360000002 HC RX W HCPCS: Performed by: EMERGENCY MEDICINE

## 2019-04-16 PROCEDURE — 99285 EMERGENCY DEPT VISIT HI MDM: CPT

## 2019-04-16 PROCEDURE — 2500000003 HC RX 250 WO HCPCS: Performed by: EMERGENCY MEDICINE

## 2019-04-16 PROCEDURE — 93005 ELECTROCARDIOGRAM TRACING: CPT | Performed by: EMERGENCY MEDICINE

## 2019-04-16 PROCEDURE — 83735 ASSAY OF MAGNESIUM: CPT

## 2019-04-16 PROCEDURE — 93005 ELECTROCARDIOGRAM TRACING: CPT | Performed by: INTERNAL MEDICINE

## 2019-04-16 PROCEDURE — 2580000003 HC RX 258: Performed by: NURSE PRACTITIONER

## 2019-04-16 PROCEDURE — 6360000002 HC RX W HCPCS: Performed by: NURSE PRACTITIONER

## 2019-04-16 PROCEDURE — 36415 COLL VENOUS BLD VENIPUNCTURE: CPT

## 2019-04-16 PROCEDURE — 85610 PROTHROMBIN TIME: CPT

## 2019-04-16 PROCEDURE — 6370000000 HC RX 637 (ALT 250 FOR IP): Performed by: NURSE PRACTITIONER

## 2019-04-16 PROCEDURE — 96375 TX/PRO/DX INJ NEW DRUG ADDON: CPT

## 2019-04-16 PROCEDURE — 85025 COMPLETE CBC W/AUTO DIFF WBC: CPT

## 2019-04-16 PROCEDURE — 2140000000 HC CCU INTERMEDIATE R&B

## 2019-04-16 PROCEDURE — 85730 THROMBOPLASTIN TIME PARTIAL: CPT

## 2019-04-16 PROCEDURE — 96365 THER/PROPH/DIAG IV INF INIT: CPT

## 2019-04-16 PROCEDURE — 84484 ASSAY OF TROPONIN QUANT: CPT

## 2019-04-16 PROCEDURE — 6360000004 HC RX CONTRAST MEDICATION: Performed by: EMERGENCY MEDICINE

## 2019-04-16 RX ORDER — ACETAMINOPHEN 325 MG/1
650 TABLET ORAL EVERY 4 HOURS PRN
Status: DISCONTINUED | OUTPATIENT
Start: 2019-04-16 | End: 2019-04-22 | Stop reason: HOSPADM

## 2019-04-16 RX ORDER — DILTIAZEM HYDROCHLORIDE 5 MG/ML
15 INJECTION INTRAVENOUS ONCE
Status: COMPLETED | OUTPATIENT
Start: 2019-04-16 | End: 2019-04-16

## 2019-04-16 RX ORDER — LISINOPRIL 10 MG/1
5 TABLET ORAL DAILY
Status: CANCELLED | OUTPATIENT
Start: 2019-04-16

## 2019-04-16 RX ORDER — PANTOPRAZOLE SODIUM 40 MG/1
40 TABLET, DELAYED RELEASE ORAL
Status: DISCONTINUED | OUTPATIENT
Start: 2019-04-17 | End: 2019-04-22 | Stop reason: HOSPADM

## 2019-04-16 RX ORDER — DILTIAZEM HYDROCHLORIDE 180 MG/1
360 CAPSULE, COATED, EXTENDED RELEASE ORAL DAILY
Status: DISCONTINUED | OUTPATIENT
Start: 2019-04-17 | End: 2019-04-22 | Stop reason: HOSPADM

## 2019-04-16 RX ORDER — DILTIAZEM HYDROCHLORIDE 5 MG/ML
15 INJECTION INTRAVENOUS ONCE
Status: DISCONTINUED | OUTPATIENT
Start: 2019-04-16 | End: 2019-04-16

## 2019-04-16 RX ORDER — METOPROLOL SUCCINATE 25 MG/1
12.5 TABLET, EXTENDED RELEASE ORAL DAILY
Status: DISCONTINUED | OUTPATIENT
Start: 2019-04-17 | End: 2019-04-18

## 2019-04-16 RX ORDER — GABAPENTIN 100 MG/1
100 CAPSULE ORAL NIGHTLY
Status: DISCONTINUED | OUTPATIENT
Start: 2019-04-16 | End: 2019-04-22 | Stop reason: HOSPADM

## 2019-04-16 RX ORDER — ASPIRIN 81 MG/1
81 TABLET, CHEWABLE ORAL DAILY
Status: DISCONTINUED | OUTPATIENT
Start: 2019-04-17 | End: 2019-04-22 | Stop reason: HOSPADM

## 2019-04-16 RX ORDER — LEVOTHYROXINE SODIUM 0.1 MG/1
100 TABLET ORAL
Status: DISCONTINUED | OUTPATIENT
Start: 2019-04-17 | End: 2019-04-22 | Stop reason: HOSPADM

## 2019-04-16 RX ORDER — SODIUM CHLORIDE 0.9 % (FLUSH) 0.9 %
10 SYRINGE (ML) INJECTION PRN
Status: DISCONTINUED | OUTPATIENT
Start: 2019-04-16 | End: 2019-04-22 | Stop reason: HOSPADM

## 2019-04-16 RX ORDER — MAGNESIUM SULFATE 1 G/100ML
1 INJECTION INTRAVENOUS PRN
Status: DISCONTINUED | OUTPATIENT
Start: 2019-04-16 | End: 2019-04-22 | Stop reason: HOSPADM

## 2019-04-16 RX ORDER — NITROGLYCERIN 0.4 MG/1
0.4 TABLET SUBLINGUAL EVERY 5 MIN PRN
Status: DISCONTINUED | OUTPATIENT
Start: 2019-04-16 | End: 2019-04-22 | Stop reason: HOSPADM

## 2019-04-16 RX ORDER — FUROSEMIDE 10 MG/ML
20 INJECTION INTRAMUSCULAR; INTRAVENOUS DAILY
Status: DISCONTINUED | OUTPATIENT
Start: 2019-04-16 | End: 2019-04-20

## 2019-04-16 RX ORDER — ONDANSETRON 2 MG/ML
4 INJECTION INTRAMUSCULAR; INTRAVENOUS EVERY 6 HOURS PRN
Status: DISCONTINUED | OUTPATIENT
Start: 2019-04-16 | End: 2019-04-22 | Stop reason: HOSPADM

## 2019-04-16 RX ORDER — SODIUM CHLORIDE 0.9 % (FLUSH) 0.9 %
10 SYRINGE (ML) INJECTION PRN
Status: DISCONTINUED | OUTPATIENT
Start: 2019-04-16 | End: 2019-04-16 | Stop reason: SDUPTHER

## 2019-04-16 RX ORDER — PRAVASTATIN SODIUM 20 MG
20 TABLET ORAL NIGHTLY
Status: DISCONTINUED | OUTPATIENT
Start: 2019-04-16 | End: 2019-04-22 | Stop reason: HOSPADM

## 2019-04-16 RX ORDER — SODIUM CHLORIDE 0.9 % (FLUSH) 0.9 %
10 SYRINGE (ML) INJECTION EVERY 12 HOURS SCHEDULED
Status: DISCONTINUED | OUTPATIENT
Start: 2019-04-16 | End: 2019-04-22 | Stop reason: HOSPADM

## 2019-04-16 RX ADMIN — GABAPENTIN 100 MG: 100 CAPSULE ORAL at 20:25

## 2019-04-16 RX ADMIN — SODIUM CHLORIDE, PRESERVATIVE FREE 10 ML: 5 INJECTION INTRAVENOUS at 19:06

## 2019-04-16 RX ADMIN — MAGNESIUM SULFATE HEPTAHYDRATE 1 G: 1 INJECTION, SOLUTION INTRAVENOUS at 19:58

## 2019-04-16 RX ADMIN — SODIUM CHLORIDE, PRESERVATIVE FREE 10 ML: 5 INJECTION INTRAVENOUS at 20:27

## 2019-04-16 RX ADMIN — ACETAMINOPHEN 650 MG: 325 TABLET ORAL at 23:06

## 2019-04-16 RX ADMIN — SODIUM CHLORIDE, PRESERVATIVE FREE 10 ML: 5 INJECTION INTRAVENOUS at 15:30

## 2019-04-16 RX ADMIN — IOPAMIDOL 75 ML: 755 INJECTION, SOLUTION INTRAVENOUS at 15:30

## 2019-04-16 RX ADMIN — CEFTRIAXONE 1 G: 1 INJECTION, POWDER, FOR SOLUTION INTRAMUSCULAR; INTRAVENOUS at 16:06

## 2019-04-16 RX ADMIN — SODIUM CHLORIDE, PRESERVATIVE FREE 10 ML: 5 INJECTION INTRAVENOUS at 20:34

## 2019-04-16 RX ADMIN — ENOXAPARIN SODIUM 40 MG: 40 INJECTION SUBCUTANEOUS at 20:26

## 2019-04-16 RX ADMIN — DILTIAZEM HYDROCHLORIDE 15 MG: 5 INJECTION INTRAVENOUS at 15:59

## 2019-04-16 RX ADMIN — AZITHROMYCIN MONOHYDRATE 500 MG: 500 INJECTION, POWDER, LYOPHILIZED, FOR SOLUTION INTRAVENOUS at 19:13

## 2019-04-16 RX ADMIN — PRAVASTATIN SODIUM 20 MG: 20 TABLET ORAL at 20:26

## 2019-04-16 RX ADMIN — MAGNESIUM SULFATE HEPTAHYDRATE 1 G: 1 INJECTION, SOLUTION INTRAVENOUS at 19:03

## 2019-04-16 RX ADMIN — FUROSEMIDE 20 MG: 10 INJECTION, SOLUTION INTRAVENOUS at 20:25

## 2019-04-16 ASSESSMENT — PAIN SCALES - GENERAL
PAINLEVEL_OUTOF10: 0

## 2019-04-16 NOTE — TELEPHONE ENCOUNTER
pts family asking if throwing up has anything to do with pts afib. Saw NP yesterday and sees Ashu Molina wed. Spoke with Nati Carney and says to send him to ED.  pts family voiced understanding and are going.

## 2019-04-16 NOTE — TELEPHONE ENCOUNTER
Rachel Conrad to tell patient and family he needs to go to ED for A fib. Patient seen Luis Miguel Amado CNP yesterday and was advised to go to ED if symptoms got any worse.

## 2019-04-16 NOTE — PROGRESS NOTES
(CYANOCOBALAMIN) 100 MCG tablet Take 50 mcg by mouth daily. Yes Historical Provider, MD   celecoxib (CELEBREX) 200 MG capsule Take 200 mg by mouth 2 times daily. Yes Historical Provider, MD   levothyroxine (SYNTHROID) 100 MCG tablet Take 100 mcg by mouth daily. Yes Historical Provider, MD   aspirin 81 MG chewable tablet Take 81 mg by mouth daily. Yes Historical Provider, MD     Medications removed from list (include reason, ex. noncompliance, medication cost, therapy complete etc.):   Amlodipine d/c'd by another discipline     Other Comments:  Medication list reviewed with patient, family and insurance claims verified. Patient states he has taken first doses of medications today. Eliquis therapy updated, patient states he receives samples from his PCP.   Recent claims for Lisinopril and hydrochlorothiazide, patient states his PCP d/c'd these medications    To my knowledge the above medication history is accurate as of 4/16/2019 4:40 PM.   Parrish Otero CPhT   4/16/2019 4:40 PM

## 2019-04-16 NOTE — TELEPHONE ENCOUNTER
Called pt back and he did not want to r/s his appt with Dr. Konstantin Robison for 04/26/2019. Pt is keeping appt on 04/17/2019.  Justin Chambers notified

## 2019-04-16 NOTE — H&P
History and Physical      Name:  Fletcher Goss /Age/Sex: 1945  (75 y.o. male)   MRN & CSN:  [id number] & [id number] Admission Date/Time: 2019  1:50 PM   Location:  ED14/ED-14 PCP: Evin Cifuentes Day: 1        Admitting Physician: Dr. Pavithra Khan and Plan:   Fletcher Goss is a 75 y.o. male who presents with  Shortness of Breath/ Fatigue     Atrial fibrillation with tachycardic ventricular rate 130s. BNP 6385. Hx of ablation 2016. Admit to Med/Surg   Telemetry    Continue Cardizem infusion   Continue PO BB/Cardizem   Cardiology consult initiated in ED. Pending CTA    Eliquis held d/t possible pending ablation   Pending echo- last TTE- limited 2016 Sclerotic aortic valve which is mildly stenosed. Mild pulmonary hypertension       Bronchitis- recent course abx by PCP. Likely attributing to symptoms. Leukocytosis 13.3   Pending cultures   Respiratory interventions- IS, supplemental oxygen, continuous/ spot check pulse oximetry, and TCDB. Add prn bronchodilators   IV rocephin/azithro       Essential hypertension- continue home antihypertensive regimen- Monitor BP trends.  Hypothyroid- continue synthroid. Last TSH 0.722 (2019)    Diet No diet orders on file   DVT Prophylaxis [] Lovenox, []  Heparin, [] SCDs, [] Ambulation  [x] Long term AC   GI Prophylaxis [x] PPI,  [] H2 Blocker,  [] Carafate,  [] Diet/Tube Feeds   Code Status Full     Disposition Admit to [facility]. Patient plans to return home upon discharge   MDM [] Low, [] Moderate,[x]  High     -Patient assessment and plan discussed and reviewed with admitting physician: Gabriel Nugent MD.     History of Present Illness:     Chief Complaint: Shortness of Breath/ Fatigue    Fletcher Goss is a 75 y.o. male who presents with worsening dysnpea at rest and exertional intolerance. Associated fatigue. Symptoms ongoing x 2 weeks but worsening dyspnea.  He was directed to come to ED Medical History:      Past Medical History:   Diagnosis Date    Aneurysm ([address]     12 YEARS AGO, NON SURGICAL    Arthritis     Atrial fibrillation (HCC)     H/O 24 hour EKG monitoring 05-    PRED. RHYTHM SR W/INFREQUENT VENT. AND SUPRAVENT ECTOPY W/SHORT RUNS OF SVT. LONG RUN 10 BEATS.    H/O cardiovascular stress test 05-    [facility]) NORMAL. EF 56%.  H/O cardiovascular stress test 6/6/2016    lexiscan-normal,EF51%    H/O echocardiogram 05-    EF 55%. MILD CONCENTRIC LEFT VENT. HYPERTROPHY. MILD AORTIC VALVE CALCIFICATION. MILD VALVULAR AORTIC STENOSIS. MOD. AORTIC REGURG. MILD AORTIC ROOT DILATION.    H/O gastroesophageal reflux (GERD)     H/O prior ablation treatment 08/16/2016    History of atrial fibrillation     Hx-TIA (transient ischemic attack)     Hyperlipidemia     Hypertension     Migraine     Rotator cuff injury     Terson's syndrome ([address] 05-    PARS PLANA VITRECTOMY LEFT EYE    Thyroid disease     HYPOTHYROIDISM       Past Surgical  History:    has a past surgical history that includes Brain aneurysm surgery; knee surgery (1977); knee surgery; hernia repair; vitrectomy (05-); Cataract removal (2012); Atrial ablation surgery (N/A, 08/16/2016); and ablation of dysrhythmic focus. Social History:    FAM HX: Assessed   family history includes High Blood Pressure in his mother. Soc HX:   Social History     Socioeconomic History    Marital status:      Spouse name: None    Number of children: None    Years of education: None    Highest education level: None   Occupational History    None   Social Needs    Financial resource strain: None    Food insecurity:     Worry: None     Inability: None    Transportation needs:     Medical: None     Non-medical: None   Tobacco Use    Smoking status: Never Smoker    Smokeless tobacco: Never Used    Tobacco comment: reviewed   Substance and Sexual Activity    Alcohol use: No    Drug use:  No  Sexual activity: Not Currently   Lifestyle    Physical activity:     Days per week: None     Minutes per session: None    Stress: None   Relationships    Social connections:     Talks on phone: None     Gets together: None     Attends Restorationism service: None     Active member of club or organization: None     Attends meetings of clubs or organizations: None     Relationship status: None    Intimate partner violence:     Fear of current or ex partner: None     Emotionally abused: None     Physically abused: None     Forced sexual activity: None   Other Topics Concern    None   Social History Narrative    None     TOBACCO:   reports that he has never smoked. He has never used smokeless tobacco.  ETOH:   reports that he does not drink alcohol. Drugs:  reports that he does not use drugs. Allergies: Allergies   Allergen Reactions    Codeine Nausea And Vomiting    Vicodin [Hydrocodone-Acetaminophen] Nausea And Vomiting     Home Medications:     Prior to Admission medications    Medication Sig Start Date End Date Taking?  Authorizing Provider   metoprolol succinate (TOPROL XL) 25 MG extended release tablet Take 0.5 tablets by mouth daily 4/15/19  Yes NAVNEET Almeida CNP   apixaban (ELIQUIS) 5 MG TABS tablet Take one tablet twice daily 3/21/19  Yes Mariam Jo MD   pravastatin (PRAVACHOL) 20 MG tablet Take 1 tablet by mouth daily  Patient taking differently: Take 20 mg by mouth nightly  12/11/18  Yes Que Arroyo MD   diltiazem (CARDIZEM CD) 360 MG extended release capsule TAKE ONE (1) CAPSULE BY MOUTH ONCE DAILY 11/14/18  Yes Que Arroyo MD   traMADol-acetaminophen (ULTRACET) 37.5-325 MG per tablet 1 tablet every 4 hours as needed  1/17/17  Yes Historical Provider, MD   omeprazole (PRILOSEC) 40 MG capsule Take 40 mg by mouth daily   Yes Historical Provider, MD   ferrous sulfate 325 (65 FE) MG tablet Take 325 mg by mouth daily (with breakfast)   Yes Historical Provider, MD   gabapentin Exam: Initial FINDINGS: There is thoracic scoliosis. There is a thoracic spinal cord stimulator. There is cardiomegaly.   There are patchy bilateral airspace/ground-glass opacities     Cardiomegaly with bilateral ground-glass and airspace opacities which may represent pneumonia or edema     EKG this visit:  Reviewed           Electronically signed by NAVNEET Monreal CNP on 4/16/2019 at 3:16 PM

## 2019-04-16 NOTE — ED PROVIDER NOTES
Triage Chief Complaint:   Shortness of Breath (increasing shortness of breath, worse with any exertion; seen by Dr. Harry Fierro yesterday; oxgen sat in triage is 88-89% on room air ); Fatigue (increasing fatigue ); Emesis (pt reports one episode of emesis this am; denies abdominal pain); and Tachycardia (pt told he was in a.fib in the office, pt history of a.fib and cardioversion with Dr. López Pereira; pt is on eliquis)    [place]:  Micah Patel is a 75 y.o. male that presents to the ED with shortness of breath. + fatigue over the past 2 weeks, shortness of breath worsened with minimal exertion. Had a headache several days ago, lasting approx 1 hour with headache. No new numbness or tingling. Does not appear to be confused at the bedside. Saw cardiologist yesterday and was diagnosed with afib and currently on eliquis. No hx of PE. He came in today because of persistence of the symptoms. ROS:  General:  No fevers, no chills, + weakness  Eyes:  No recent vison changes, no discharge  ENT:  No sore throat, no nasal congestion, no hearing changes  Cardiovascular:  No chest pain, no palpitations  Respiratory:  + shortness of breath, no cough, no wheezing  Gastrointestinal:  No pain, no nausea, no vomiting, no diarrhea  Musculoskeletal:  No muscle pain, no joint pain  Skin:  No rash, no pruritis, no easy bruising  Neurologic:  No speech problems, no headache, no extremity numbness, no extremity tingling, no extremity weakness  Psychiatric:  No anxiety  Genitourinary:  No dysuria, no hematuria  Endocrine:  No unexpected weight gain, no unexpected weight loss  Extremities:  no edema, no pain    Past Medical History:   Diagnosis Date    Aneurysm (HCC)     12 YEARS AGO, NON SURGICAL    Arthritis     Atrial fibrillation (HCC)     H/O 24 hour EKG monitoring 05-    PRED. RHYTHM SR W/INFREQUENT VENT. AND SUPRAVENT ECTOPY W/SHORT RUNS OF SVT.  LONG RUN 10 BEATS.    H/O cardiovascular stress test 05-    Adalgisa Cornell NORMAL. EF 56%.  H/O cardiovascular stress test 6/6/2016    lexiscan-normal,EF51%    H/O echocardiogram 05-    EF 55%. MILD CONCENTRIC LEFT VENT. HYPERTROPHY. MILD AORTIC VALVE CALCIFICATION. MILD VALVULAR AORTIC STENOSIS. MOD. AORTIC REGURG.  MILD AORTIC ROOT DILATION.   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 PLANA VITRECTOMY LEFT EYE    Thyroid disease     HYPOTHYROIDISM     Past Surgical History:   Procedure Laterality Date    ABLATION OF DYSRHYTHMIC FOCUS      ATRIAL ABLATION SURGERY N/A 08/16/2016    atrial fibrillation ablation    BRAIN ANEURYSM SURGERY      has coil    CATARACT REMOVAL  2012    HERNIA REPAIR      LEFT    KNEE SURGERY  1977    knee  left    KNEE SURGERY      VITRECTOMY  05-    LEFT EYE      Family History   Problem Relation Age of Onset    High Blood Pressure Mother      Social History     Socioeconomic History    Marital status:      Spouse name: Not on file    Number of children: Not on file    Years of education: Not on file    Highest education level: Not on file   Occupational History    Not on file   Social Needs    Financial resource strain: Not on file    Food insecurity:     Worry: Not on file     Inability: Not on file    Transportation needs:     Medical: Not on file     Non-medical: Not on file   Tobacco Use    Smoking status: Never Smoker    Smokeless tobacco: Never Used    Tobacco comment: reviewed   Substance and Sexual Activity    Alcohol use: No    Drug use: No    Sexual activity: Not Currently   Lifestyle    Physical activity:     Days per week: Not on file     Minutes per session: Not on file    Stress: Not on file   Relationships    Social connections:     Talks on phone: Not on file Gets together: Not on file     Attends Yazdanism service: Not on file     Active member of club or organization: Not on file     Attends meetings of clubs or organizations: Not on file     Relationship status: Not on file    Intimate partner violence:     Fear of current or ex partner: Not on file     Emotionally abused: Not on file     Physically abused: Not on file     Forced sexual activity: Not on file   Other Topics Concern    Not on file   Social History Narrative    Not on file     Current Facility-Administered Medications   Medication Dose Route Frequency Provider Last Rate Last Dose    diltiazem 125 mg in dextrose 5 % 125 mL infusion  5 mg/hr Intravenous Continuous Andrea Lane MD 5 mL/hr at 04/17/19 1127 5 mg/hr at 04/17/19 1127    pill splitter             celecoxib (CELEBREX) capsule 200 mg  200 mg Oral BID Adina Morocho MD   200 mg at 04/17/19 1243    sodium chloride flush 0.9 % injection 10 mL  10 mL Intravenous 2 times per day NAVNEET Alvarenga - CNP   Stopped at 04/17/19 0804    sodium chloride flush 0.9 % injection 10 mL  10 mL Intravenous PRN Rosmery Benjamin APRN - CNP   10 mL at 04/16/19 2027    magnesium hydroxide (MILK OF MAGNESIA) 400 MG/5ML suspension 30 mL  30 mL Oral Daily PRN Rosmery Benjamin APRN - CNP        ondansetron (ZOFRAN) injection 4 mg  4 mg Intravenous Q6H PRN Rosmery Benjamin, APRN - CNP        enoxaparin (LOVENOX) injection 40 mg  40 mg Subcutaneous Nightly Rosmery Benjamin APRN - CNP   40 mg at 04/16/19 2026    acetaminophen (TYLENOL) tablet 650 mg  650 mg Oral Q4H PRN Rosmery Benjamin APRN - CNP   650 mg at 04/17/19 0417    aspirin chewable tablet 81 mg  81 mg Oral Daily Rosmery Benjamin APRN - CNP   81 mg at 04/17/19 0813    diltiazem (CARDIZEM CD) extended release capsule 360 mg  360 mg Oral Daily Rosmery Benjamin APRN - CNP   360 mg at 04/17/19 5453    gabapentin (NEURONTIN) capsule 100 mg  100 mg Oral Nightly Rosmery Benjamin, APRN - CNP   100 mg at 04/16/19 2025    levothyroxine (SYNTHROID) tablet 100 mcg  100 mcg Oral QAM AC Ruthellen Gum, APRN - CNP   100 mcg at 04/17/19 0810    metoprolol succinate (TOPROL XL) extended release tablet 12.5 mg  12.5 mg Oral Daily Ruthellen Gum, APRN - CNP   12.5 mg at 04/17/19 0813    pravastatin (PRAVACHOL) tablet 20 mg  20 mg Oral Nightly Ruthellen Gum, APRN - CNP   20 mg at 04/16/19 2026    pantoprazole (PROTONIX) tablet 40 mg  40 mg Oral QAM AC Ruthellen Gum, APRN - CNP   40 mg at 04/17/19 0509    furosemide (LASIX) injection 20 mg  20 mg Intravenous Daily Ruthellen Gum, APRN - CNP   20 mg at 04/17/19 0813    nitroGLYCERIN (NITROSTAT) SL tablet 0.4 mg  0.4 mg Sublingual Q5 Min PRN Ruthellen Gum, APRN - CNP        cefTRIAXone (ROCEPHIN) 1 g in dextrose 5 % 50 mL IVPB  1 g Intravenous Q24H Ruthellen Gum, APRN - CNP        azithromycin (ZITHROMAX) 500 mg in dextrose 5 % 250 mL IVPB  500 mg Intravenous Q24H Ruthellen Gum, APRN - CNP        magnesium sulfate 1 g in dextrose 5% 100 mL IVPB  1 g Intravenous PRN Ruthellen Gum, APRN - CNP   Stopped at 04/16/19 2010     Allergies   Allergen Reactions    Codeine Nausea And Vomiting    Vicodin [Hydrocodone-Acetaminophen] Nausea And Vomiting       Nursing Notes Reviewed    Physical Exam:  ED Triage Vitals   Enc Vitals Group      BP 04/16/19 1349 (!) 106/94      Pulse 04/16/19 1349 134      Resp 04/16/19 1349 25      Temp 04/16/19 1351 99.1 °F (37.3 °C)      Temp Source 04/16/19 1351 Oral      SpO2 04/16/19 1349 (!) 89 %      Weight 04/16/19 1353 179 lb (81.2 kg)      Height 04/16/19 1353 5' 6\" (1.676 m)      Head Circumference --       Peak Flow --       Pain Score --       Pain Loc --       Pain Edu? --       Excl. in [address]? --        My pulse ox interpretation is - normal    General appearance:  No acute distress. Skin:  Warm. Dry. Eye:  Extraocular movements intact. Ears, nose, mouth and throat:  Oral mucosa moist   Neck:  Trachea midline. Extremity:  No swelling.   Normal ROM     Heart:  Irregular rate and rhythm, normal S1 & S2, no extra heart sounds. Perfusion:  intact  Respiratory:  Lungs clear to auscultation bilaterally. Respirations nonlabored. Abdominal:  Normal bowel sounds. Soft. Nontender. Non distended. Back:  No CVA tenderness to palpation     NEUROLOGICAL: Awake and alert. GCS 15. Cranial nerves 2-12 grossly intact. Strength 5/5 throughout. Light touch sensation intact throughout. Finger to nose WNL.      I have reviewed and interpreted all of the currently available lab results from this visit (if applicable):  Results for orders placed or performed during the hospital encounter of 04/16/19   CBC Auto Differential   Result Value Ref Range    WBC 13.9 (H) 4.0 - 10.5 K/CU MM    RBC 3.83 (L) 4.6 - 6.2 M/CU MM    Hemoglobin 12.2 (L) 13.5 - 18.0 GM/DL    Hematocrit 39.2 (L) 42 - 52 %    .3 (H) 78 - 100 FL    MCH 31.9 (H) 27 - 31 PG    MCHC 31.1 (L) 32.0 - 36.0 %    RDW 13.1 11.7 - 14.9 %    Platelets 523 545 - 976 K/CU MM    MPV 9.6 7.5 - 11.1 FL    Differential Type AUTOMATED DIFFERENTIAL     Segs Relative 76.6 (H) 36 - 66 %    Lymphocytes % 10.2 (L) 24 - 44 %    Monocytes % 10.5 (H) 0 - 4 %    Eosinophils % 1.4 0 - 3 %    Basophils % 0.4 0 - 1 %    Segs Absolute 10.7 K/CU MM    Lymphocytes # 1.4 K/CU MM    Monocytes # 1.5 K/CU MM    Eosinophils # 0.2 K/CU MM    Basophils # 0.1 K/CU MM    Nucleated RBC % 0.0 %    Total Nucleated RBC 0.0 K/CU MM    Total Immature Neutrophil 0.13 K/CU MM    Immature Neutrophil % 0.9 (H) 0 - 0.43 %   Brain Natriuretic Peptide   Result Value Ref Range    Pro-BNP 6,385 (H) <300 PG/ML   Comprehensive Metabolic Panel   Result Value Ref Range    Sodium 143 135 - 145 MMOL/L    Potassium 4.0 3.5 - 5.1 MMOL/L    Chloride 109 99 - 110 mMol/L    CO2 19 (L) 21 - 32 MMOL/L    BUN 21 6 - 23 MG/DL    CREATININE 0.8 (L) 0.9 - 1.3 MG/DL    Glucose 119 (H) 70 - 99 MG/DL    Calcium 9.2 8.3 - 10.6 MG/DL    Alb 3.3 (L) 3.4 - 5.0 GM/DL    Total Protein 7.2 6.4 - 8.2 GM/DL Total Bilirubin 0.7 0.0 - 1.0 MG/DL    ALT 14 10 - 40 U/L    AST 28 15 - 37 IU/L    Alkaline Phosphatase 87 40 - 129 IU/L    GFR Non-African American >60 >60 mL/min/1.73m2    GFR African American >60 >60 mL/min/1.73m2    Anion Gap 15 4 - 16   Troponin   Result Value Ref Range    Troponin T <0.010 <0.01 NG/ML   Protime/INR & PTT   Result Value Ref Range    Protime 20.6 (H) 9.12 - 12.5 SECONDS    INR 1.79 INDEX    aPTT 42.3 (H) 21.2 - 33.0 SECONDS   Magnesium   Result Value Ref Range    Magnesium 1.4 (L) 1.8 - 2.4 mg/dl   Magnesium   Result Value Ref Range    Magnesium 1.6 (L) 1.8 - 2.4 mg/dl   Basic Metabolic Panel   Result Value Ref Range    Sodium 141 135 - 145 MMOL/L    Potassium 3.9 3.5 - 5.1 MMOL/L    Chloride 106 99 - 110 mMol/L    CO2 19 (L) 21 - 32 MMOL/L    Anion Gap 16 4 - 16    BUN 20 6 - 23 MG/DL    CREATININE 0.9 0.9 - 1.3 MG/DL    Glucose 112 (H) 70 - 99 MG/DL    Calcium 8.6 8.3 - 10.6 MG/DL    GFR Non-African American >60 >60 mL/min/1.73m2    GFR African American >60 >60 mL/min/1.73m2   Magnesium   Result Value Ref Range    Magnesium 2.0 1.8 - 2.4 mg/dl   Lipid panel - fasting   Result Value Ref Range    Triglycerides 66 <150 MG/DL    Cholesterol 101 <200 MG/DL    HDL 48 >40 MG/DL    LDL Direct 52 <100 MG/DL   Troponin   Result Value Ref Range    Troponin T <0.010 <0.01 NG/ML   Troponin   Result Value Ref Range    Troponin T <0.010 <0.01 NG/ML   Procalcitonin   Result Value Ref Range    Procalcitonin 0.231    EKG 12 Lead   Result Value Ref Range    Ventricular Rate 139 BPM    Atrial Rate 139 BPM    P-R Interval 154 ms    QRS Duration 88 ms    Q-T Interval 306 ms    QTc Calculation (Bazett) 465 ms    P Axis 40 degrees    R Axis -49 degrees    T Axis 45 degrees    Diagnosis         Sinus tachycardia with premature atrial complexes  Left anterior fascicular block  Minimal voltage criteria for LVH, may be normal variant  Possible Lateral infarct , age undetermined  Abnormal ECG  When compared with ECG of 17-AUG-2016 14:31,  Significant changes have occurred  Confirmed by Henrique Goodman MD, Destiny Lechuga ([id number]) on 4/17/2019 5:15:22 AM     EKG 12 Lead   Result Value Ref Range    Ventricular Rate 133 BPM    Atrial Rate 133 BPM    P-R Interval 190 ms    QRS Duration 98 ms    Q-T Interval 350 ms    QTc Calculation (Bazett) 520 ms    P Axis 35 degrees    R Axis -49 degrees    T Axis 52 degrees    Diagnosis       Sinus tachycardia with premature supraventricular complexes and with frequent premature ventricular complexes  Left anterior fascicular block  Left ventricular hypertrophy with repolarization abnormality  Abnormal ECG  When compared with ECG of 16-APR-2019 14:02,  premature ventricular complexes are now present  Borderline criteria for Lateral infarct are no longer present  Non-specific change in ST segment in Inferior leads        Radiographs (if obtained):  [] The following radiograph was interpreted by myself in the absence of a radiologist:   [x] Radiologist's Report Reviewed:  CTA CHEST W CONTRAST   Final Result   1. No evidence of pulmonary embolic disease. 2. Multifocal ground-glass opacification throughout the lungs. The findings   are nonspecific with an infectious or inflammatory pneumonitis favored versus   edema and heart failure. No pleural fluid. 3. Cardiomegaly. 4. Mildly enlarged main pulmonary artery indicative of pulmonary hypertension. 5. Ascending aortic ectasia at 4.4 cm maximally. XR CHEST PORTABLE   Final Result   Cardiomegaly with bilateral ground-glass and airspace opacities which may   represent pneumonia or edema               EKG (if obtained):   12 lead EKG per my interpretation:  Sinus Tachycardia 139 premature atrial complexes. Axis is   Normal  QTc is  within an acceptable range  There is no specific T wave changes appreciated. There is no specific ST wave changes appreciated.     Prior EKG to compare with was available 8/17/2018      Chart review shows recent radiographs:  No results found. MDM:   Differential diagnosis includes CHF versus pneumonia versus ACS. Labs reviewed and noted to be remarkable for hypomagnasemia. Even replenishment. Pro BNP is elevated,  x-ray  Suspicious for pulm edema versus pneumonia. Given patient had recent cough and leukocytosis IV antiobotics were initiated. CTA order to evaluate for notice be negative. Patient given Cardizem IV times one and rate controlled was noted. His blood pressure remained within normal limits. Patients will be admitted to the hospital for further management. I discussed this with the family. They agreed with the plan. I discussed this with his cardiologist and I signed out the patient to Delmar Odonnell NP. Clinical Impression:  1. Atrial fibrillation, unspecified type ([address]    2. Pneumonia due to organism    3. Congestive heart failure, unspecified HF chronicity, unspecified heart failure type [facility])      Disposition referral (if applicable):  Kerry Miranda MD  [facility] [place] [address]  [phone]          Disposition medications (if applicable):  Current Discharge Medication List          Comment: Please note this report has been produced using speech recognition software and may contain errors related to that system including errors in grammar, punctuation, and spelling, as well as words and phrases that may be inappropriate. If there are any questions or concerns please feel free to contact the dictating provider for clarification.         Kristie Ramirez MD  04/17/19 8520

## 2019-04-17 ENCOUNTER — TELEPHONE (OUTPATIENT)
Dept: CARDIOLOGY CLINIC | Age: 74
End: 2019-04-17

## 2019-04-17 LAB
ANION GAP SERPL CALCULATED.3IONS-SCNC: 16 MMOL/L (ref 4–16)
BUN BLDV-MCNC: 20 MG/DL (ref 6–23)
CALCIUM SERPL-MCNC: 8.6 MG/DL (ref 8.3–10.6)
CHLORIDE BLD-SCNC: 106 MMOL/L (ref 99–110)
CHOLESTEROL: 101 MG/DL
CO2: 19 MMOL/L (ref 21–32)
CREAT SERPL-MCNC: 0.9 MG/DL (ref 0.9–1.3)
EKG ATRIAL RATE: 139 BPM
EKG DIAGNOSIS: NORMAL
EKG P AXIS: 40 DEGREES
EKG P-R INTERVAL: 154 MS
EKG Q-T INTERVAL: 306 MS
EKG QRS DURATION: 88 MS
EKG QTC CALCULATION (BAZETT): 465 MS
EKG R AXIS: -49 DEGREES
EKG T AXIS: 45 DEGREES
EKG VENTRICULAR RATE: 139 BPM
GFR AFRICAN AMERICAN: >60 ML/MIN/1.73M2
GFR NON-AFRICAN AMERICAN: >60 ML/MIN/1.73M2
GLUCOSE BLD-MCNC: 112 MG/DL (ref 70–99)
HDLC SERPL-MCNC: 48 MG/DL
LDL CHOLESTEROL DIRECT: 52 MG/DL
LV EF: 58 %
LVEF MODALITY: NORMAL
MAGNESIUM: 2 MG/DL (ref 1.8–2.4)
POTASSIUM SERPL-SCNC: 3.9 MMOL/L (ref 3.5–5.1)
PROCALCITONIN: 0.23
SODIUM BLD-SCNC: 141 MMOL/L (ref 135–145)
TRIGL SERPL-MCNC: 66 MG/DL
TROPONIN T: <0.01 NG/ML

## 2019-04-17 PROCEDURE — 84484 ASSAY OF TROPONIN QUANT: CPT

## 2019-04-17 PROCEDURE — 94761 N-INVAS EAR/PLS OXIMETRY MLT: CPT

## 2019-04-17 PROCEDURE — 36415 COLL VENOUS BLD VENIPUNCTURE: CPT

## 2019-04-17 PROCEDURE — 83735 ASSAY OF MAGNESIUM: CPT

## 2019-04-17 PROCEDURE — 87798 DETECT AGENT NOS DNA AMP: CPT

## 2019-04-17 PROCEDURE — 2580000003 HC RX 258: Performed by: INTERNAL MEDICINE

## 2019-04-17 PROCEDURE — 93010 ELECTROCARDIOGRAM REPORT: CPT | Performed by: INTERNAL MEDICINE

## 2019-04-17 PROCEDURE — 2580000003 HC RX 258: Performed by: NURSE PRACTITIONER

## 2019-04-17 PROCEDURE — 83721 ASSAY OF BLOOD LIPOPROTEIN: CPT

## 2019-04-17 PROCEDURE — 99221 1ST HOSP IP/OBS SF/LOW 40: CPT | Performed by: INTERNAL MEDICINE

## 2019-04-17 PROCEDURE — 80061 LIPID PANEL: CPT

## 2019-04-17 PROCEDURE — 87486 CHLMYD PNEUM DNA AMP PROBE: CPT

## 2019-04-17 PROCEDURE — 2700000000 HC OXYGEN THERAPY PER DAY

## 2019-04-17 PROCEDURE — 6360000002 HC RX W HCPCS: Performed by: HOSPITALIST

## 2019-04-17 PROCEDURE — 87581 M.PNEUMON DNA AMP PROBE: CPT

## 2019-04-17 PROCEDURE — 6370000000 HC RX 637 (ALT 250 FOR IP): Performed by: HOSPITALIST

## 2019-04-17 PROCEDURE — 2500000003 HC RX 250 WO HCPCS: Performed by: INTERNAL MEDICINE

## 2019-04-17 PROCEDURE — 94664 DEMO&/EVAL PT USE INHALER: CPT

## 2019-04-17 PROCEDURE — 84145 PROCALCITONIN (PCT): CPT

## 2019-04-17 PROCEDURE — 6360000002 HC RX W HCPCS: Performed by: NURSE PRACTITIONER

## 2019-04-17 PROCEDURE — 93306 TTE W/DOPPLER COMPLETE: CPT

## 2019-04-17 PROCEDURE — 80048 BASIC METABOLIC PNL TOTAL CA: CPT

## 2019-04-17 PROCEDURE — 2140000000 HC CCU INTERMEDIATE R&B

## 2019-04-17 PROCEDURE — 6370000000 HC RX 637 (ALT 250 FOR IP): Performed by: NURSE PRACTITIONER

## 2019-04-17 RX ORDER — CELECOXIB 200 MG/1
200 CAPSULE ORAL 2 TIMES DAILY
Status: DISCONTINUED | OUTPATIENT
Start: 2019-04-17 | End: 2019-04-22 | Stop reason: HOSPADM

## 2019-04-17 RX ORDER — ACETAMINOPHEN 80 MG
TABLET,CHEWABLE ORAL
Status: DISPENSED
Start: 2019-04-17 | End: 2019-04-17

## 2019-04-17 RX ADMIN — ACETAMINOPHEN 650 MG: 325 TABLET ORAL at 17:43

## 2019-04-17 RX ADMIN — ENOXAPARIN SODIUM 80 MG: 80 INJECTION SUBCUTANEOUS at 21:59

## 2019-04-17 RX ADMIN — FUROSEMIDE 20 MG: 10 INJECTION, SOLUTION INTRAVENOUS at 08:13

## 2019-04-17 RX ADMIN — LEVOTHYROXINE SODIUM 100 MCG: 100 TABLET ORAL at 05:09

## 2019-04-17 RX ADMIN — CELECOXIB 200 MG: 200 CAPSULE ORAL at 12:43

## 2019-04-17 RX ADMIN — DILTIAZEM HYDROCHLORIDE 360 MG: 180 CAPSULE, EXTENDED RELEASE ORAL at 08:12

## 2019-04-17 RX ADMIN — DILTIAZEM HYDROCHLORIDE 5 MG/HR: 5 INJECTION INTRAVENOUS at 00:54

## 2019-04-17 RX ADMIN — ACETAMINOPHEN 650 MG: 325 TABLET ORAL at 21:58

## 2019-04-17 RX ADMIN — PRAVASTATIN SODIUM 20 MG: 20 TABLET ORAL at 21:58

## 2019-04-17 RX ADMIN — DILTIAZEM HYDROCHLORIDE 10 MG/HR: 5 INJECTION INTRAVENOUS at 21:58

## 2019-04-17 RX ADMIN — PANTOPRAZOLE SODIUM 40 MG: 40 TABLET, DELAYED RELEASE ORAL at 05:09

## 2019-04-17 RX ADMIN — ASPIRIN 81 MG 81 MG: 81 TABLET ORAL at 08:13

## 2019-04-17 RX ADMIN — METOPROLOL SUCCINATE 12.5 MG: 25 TABLET, EXTENDED RELEASE ORAL at 08:13

## 2019-04-17 RX ADMIN — ACETAMINOPHEN 650 MG: 325 TABLET ORAL at 04:17

## 2019-04-17 RX ADMIN — CEFTRIAXONE 1 G: 1 INJECTION, POWDER, FOR SOLUTION INTRAMUSCULAR; INTRAVENOUS at 17:35

## 2019-04-17 RX ADMIN — GABAPENTIN 100 MG: 100 CAPSULE ORAL at 21:59

## 2019-04-17 RX ADMIN — CELECOXIB 200 MG: 200 CAPSULE ORAL at 21:59

## 2019-04-17 RX ADMIN — DILTIAZEM HYDROCHLORIDE 7.5 MG/HR: 5 INJECTION INTRAVENOUS at 10:39

## 2019-04-17 RX ADMIN — AZITHROMYCIN MONOHYDRATE 500 MG: 500 INJECTION, POWDER, LYOPHILIZED, FOR SOLUTION INTRAVENOUS at 18:37

## 2019-04-17 ASSESSMENT — PAIN DESCRIPTION - FREQUENCY
FREQUENCY: INTERMITTENT
FREQUENCY: INTERMITTENT

## 2019-04-17 ASSESSMENT — PAIN SCALES - GENERAL
PAINLEVEL_OUTOF10: 0
PAINLEVEL_OUTOF10: 4
PAINLEVEL_OUTOF10: 0
PAINLEVEL_OUTOF10: 4
PAINLEVEL_OUTOF10: 8
PAINLEVEL_OUTOF10: 0
PAINLEVEL_OUTOF10: 4
PAINLEVEL_OUTOF10: 0

## 2019-04-17 ASSESSMENT — PAIN DESCRIPTION - DESCRIPTORS
DESCRIPTORS: ACHING
DESCRIPTORS: ACHING

## 2019-04-17 ASSESSMENT — PAIN DESCRIPTION - ORIENTATION
ORIENTATION: LOWER
ORIENTATION: LOWER

## 2019-04-17 ASSESSMENT — PAIN DESCRIPTION - PAIN TYPE
TYPE: ACUTE PAIN
TYPE: ACUTE PAIN

## 2019-04-17 ASSESSMENT — PAIN DESCRIPTION - LOCATION
LOCATION: BACK
LOCATION: BACK

## 2019-04-17 NOTE — CONSULTS
Nutrition Education    Type and Reason for Visit: Consult, Patient Education(heart failure)    · Verbally reviewed following information with Patient and Family: Heart Healthy Eating Nutrition Therapy (Nutrition Care Manual). · Written educational materials provided. · Contact name and number provided. · Refer to Patient Education activity for more details. · Time spent educating pt: 15 minutes.     Electronically signed by Susie Dow RD, LD on 4/17/19 at 1:30 PM    Contact Number: [id number]

## 2019-04-17 NOTE — CONSULTS
CARDIOLOGY CONSULT NOTE   Reason for consultation:  afib RVR  Referring physician:  Rupali Simmons MD     Primary care physician: Carlie King MD      Dear Dr. Ana Castillo   Thanks for the consult. History of present illness:Toni is a 75 y. o.year old who  presents with for shortness of breath which is mild, for few days, intermittent, self limiting, + associated with cough or fever, gets worse with activity and better with rest,  He had cold for 2 weeks,his blood pressure was low and his lisinopril was stopped, he is diagnosed with pneumonia  Chief Complaint   Patient presents with    Shortness of Breath     increasing shortness of breath, worse with any exertion; seen by Dr. Abhi Porter yesterday; oxgen sat in triage is 88-89% on room air     Fatigue     increasing fatigue     Emesis     pt reports one episode of emesis this am; denies abdominal pain    Tachycardia     pt told he was in a.fib in the office, pt history of a.fib and cardioversion with Dr. Edison Salmeron; pt is on eliquis     Blood pressure, cholesterol, blood glucose and weight are well controlled. 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 Past surgical history:   has a past surgical history that includes Brain aneurysm surgery; knee surgery (1977); knee surgery; hernia repair; vitrectomy (05-); Cataract removal (2012); Atrial ablation surgery (N/A, 08/16/2016); and ablation of dysrhythmic focus. Social History:   reports that he has never smoked.  He has never used smokeless tobacco. He reports that he does not drink alcohol or use drugs.   Family history:   no family history of CAD, STROKE of DM    Allergies   Allergen Reactions    Codeine Nausea And Vomiting    Vicodin [Hydrocodone-Acetaminophen] Nausea And Vomiting         diltiazem 125 mg in dextrose 5 % 125 mL infusion Continuous   sodium chloride flush 0.9 % injection 10 mL 2 times per day   sodium chloride flush 0.9 % injection 10 mL PRN   magnesium hydroxide (MILK OF MAGNESIA) 400 MG/5ML suspension 30 mL Daily PRN   ondansetron (ZOFRAN) injection 4 mg Q6H PRN   enoxaparin (LOVENOX) injection 40 mg Nightly   acetaminophen (TYLENOL) tablet 650 mg Q4H PRN   aspirin chewable tablet 81 mg Daily   diltiazem (CARDIZEM CD) extended release capsule 360 mg Daily   gabapentin (NEURONTIN) capsule 100 mg Nightly   levothyroxine (SYNTHROID) tablet 100 mcg QAM AC   metoprolol succinate (TOPROL XL) extended release tablet 12.5 mg Daily   pravastatin (PRAVACHOL) tablet 20 mg Nightly   pantoprazole (PROTONIX) tablet 40 mg QAM AC   furosemide (LASIX) injection 20 mg Daily   nitroGLYCERIN (NITROSTAT) SL tablet 0.4 mg Q5 Min PRN   cefTRIAXone (ROCEPHIN) 1 g in dextrose 5 % 50 mL IVPB Q24H   azithromycin (ZITHROMAX) 500 mg in dextrose 5 % 250 mL IVPB Q24H   magnesium sulfate 1 g in dextrose 5% 100 mL IVPB PRN     Current Facility-Administered Medications   Medication Dose Route Frequency Provider Last Rate Last Dose    diltiazem 125 mg in dextrose 5 % 125 mL infusion  5 mg/hr Intravenous Continuous Carla Koch MD 15 mL/hr at 04/17/19 0205 15 mg/hr at 04/17/19 0205    sodium chloride flush 0.9 % injection 10 mL  10 mL Intravenous 2 times per day Gloria Stains, APRN - CNP   10 mL at 04/16/19 2034    sodium chloride flush 0.9 % injection 10 mL  10 mL Intravenous PRN Gloria Stains, APRN - CNP   10 mL at 04/16/19 2027    magnesium hydroxide (MILK OF MAGNESIA) 400 MG/5ML suspension 30 mL  30 mL Oral Daily PRN Gloria Stains, APRN - CNP        ondansetron [facility]) injection 4 mg  4 mg Intravenous Q6H PRN [facility] AARON LaneN - CNP        enoxaparin (LOVENOX) injection 40 mg  40 mg Subcutaneous Nightly [facility] AARON LaneN - CNP   40 mg at 04/16/19 2026    acetaminophen (TYLENOL) tablet 650 mg  650 mg Oral Q4H PRN [facility] NAVNEET Lane - CNP   650 mg at 04/17/19 0417    aspirin chewable tablet 81 mg  81 mg Oral Daily [facility] NAVNEET Lane - CNP        diltiazem (CARDIZEM CD) extended release capsule 360 mg  360 mg [facility] Daily [facility]        gabapentin (NEURONTIN) capsule 100 mg  100 mg Oral Nightly [facility] AARON LaneN - CNP   100 mg at 04/16/19 2025    levothyroxine (SYNTHROID) tablet 100 mcg  100 mcg Oral QAM [facility]   100 mcg at 04/17/19 7536    metoprolol succinate (TOPROL XL) extended release tablet 12.5 mg  12.5 mg Oral Daily [facility] NAVNEET Lane - CNP        pravastatin (PRAVACHOL) tablet 20 mg  20 mg Oral Nightly [facility] AARON LaneN - CNP   20 mg at 04/16/19 2026    pantoprazole (PROTONIX) tablet 40 mg  40 mg Oral QAM [facility] AARON LaneN - CNP   40 mg at 04/17/19 0509    furosemide (LASIX) injection 20 mg  20 mg Intravenous Daily [facility] NAVNEET Lane - CNP   20 mg at 04/16/19 2025    nitroGLYCERIN (NITROSTAT) SL tablet 0.4 mg  0.4 mg Sublingual Q5 Min PRN [facility] NAVNEET Lane - CNP        cefTRIAXone (ROCEPHIN) 1 g in dextrose 5 % 50 mL IVPB  1 g Intravenous Q24H [facility] AARON LaneN - CNP        azithromycin (ZITHROMAX) 500 mg in dextrose 5 % 250 mL IVPB  500 mg Intravenous Q24H [facility] NAVNEET Lane - CNP        magnesium sulfate 1 g in dextrose 5% 100 mL IVPB  1 g Intravenous PRN [facility] NAVNEET Lane - CNP   Stopped at 04/16/19 2010     Review of Systems:   · Constitutional: No Fever or Weight Loss   · Eyes: No Decreased Vision  · ENT: No Headaches, Hearing Loss or Vertigo  · Cardiovascular: No chest pain, dyspnea on exertion, palpitations or loss of consciousness  · Respiratory: No cough or wheezing    · Gastrointestinal: No abdominal pain, appetite loss, blood in stools, constipation, diarrhea diet  All labs, medications and tests reviewed, continue all other medications of all above medical condition listed as is.          Isha Nettles MD, 4/17/2019 7:24 AM

## 2019-04-17 NOTE — PROGRESS NOTES
EKG completed at this time for irregular heart rhythm and results are Sinus tachycardia with premature supraventricular complexes with frequent premature ventricular complexes. Will continue to monitor patient.

## 2019-04-17 NOTE — PROGRESS NOTES
SKIN ASSESSMENT ON ADMISSION BY FERNANDO SHARMA AND OSVALDO COURTNEY found intact bruised skin right lower back and BETTY large purple veins with palpable pulses to pedal dorsalis. Intact scar on left lower back. States having a neuro stimulator implanted.

## 2019-04-17 NOTE — TELEPHONE ENCOUNTER
The pt daughter called about a possible procedure for her father. She is concerned when they might do the ablation for her father since he is NPO at the moment I explained that she would need to speak with the nursing staff at the hospital and they could answer her question .

## 2019-04-17 NOTE — PROGRESS NOTES
Patient called for assistance and patient noted to be visibly shivering. Patient warm to touch, patient states that he does not feel like he is running a temperature, but is not sure why he is shaking. /102   (irregular) RR 35 T 101.3/rectal.  No SOB or labored breathing observed. HR has been fluctuating between 120-140. Message sent to Mohan Cantu NP through perfect serve to notify of elevated temperature and consistently elevated HR's. Awaiting response.

## 2019-04-17 NOTE — PROGRESS NOTES
[address] [place] [address]  HOSPITALIST PROGRESS NOTE                       Name:  Gris Talamantes /Age/Sex: 1945  (75 y.o. male)   MRN & CSN:  [id number] & [id number] Admission Date/Time: 2019  1:50 PM   Location:  [address] [place] [address] Attending:  Luly Braswell MD                                                  HPI  Gris Talamantes is a 75 y.o. male who presents shortness of breath/atrial fib with RVR    SUBJECTIVE  - when seen this morning, admits to cough with no significant shortness of breath, also reports episodes of fever/chills. No nausea. Was on cardizem drip    10 point review of systems reviewed and negative unless noted above. ALLERGIES:   Allergies   Allergen Reactions    Codeine Nausea And Vomiting    Vicodin [Hydrocodone-Acetaminophen] Nausea And Vomiting       PCP: Álvaro Ellington MD    PAST MEDICAL HISTORY, SURGICAL HISTORY, SOCIAL HISTORY and  HOME MEDICATIONS all reviewed. OBJECTIVE  Vitals:    19 0420 19 0510 19 0803 19 0812   BP:   105/82 105/82   Pulse: 119 140 123 118   Resp:     Temp:   98.1 °F (36.7 °C)    TempSrc:   Oral    SpO2: 93%  94%    Weight:       Height:           PHYSICAL EXAM   GEN Awake male, sitting upright in bed in no apparent distress. Appears given age. EYES Pupils are equally round. No scleral erythema, discharge, or conjunctivitis. HENT Mucous membranes are moist. Oral pharynx without exudates, no evidence of thrush. NECK Supple, no apparent thyromegaly or masses. RESP Unlabored respiration, bilateral rhonchi  CARDIO/VASC S1/S2 auscultated. Regular rate without appreciable murmurs, rubs, or gallops. No JVD or carotid bruits. Peripheral pulses equal bilaterally and palpable. GI Abdomen is soft without significant tenderness, masses, or guarding. Bowel sounds are normoactive. Rectal exam deferred.  No costovertebral angle tenderness. Normal appearing external genitalia. Solorzano catheter is not present.   HEME/LYMPH No

## 2019-04-17 NOTE — PROGRESS NOTES
Dr. Nikky Bassett on call for Dr. Darya Milligan called at this time. Notified of patient's admitting diagnosis, Cardizem given in the ER x1 dose. Also aware of HR consistently between 120-140 and up to 170 at times. Informed nurse to start Cardizem drip at this time.

## 2019-04-17 NOTE — PROGRESS NOTES
Message sent to Martina Clements NP through [employer] concerning patient and how his temperature continues to be elevated from 101.3/rectal to 102.7/ rectal.  Informed of HR to be in the 140's and Cardizem drip started. Also aware that Tylenol was given earlier. Close monitoring of patient throughout the night. No new orders received at present time. Patient resting in bed with eyes closed. No distress noted. All comfort measures offered.

## 2019-04-18 LAB
ADENOVIRUS DETECTION BY PCR: NOT DETECTED
ANION GAP SERPL CALCULATED.3IONS-SCNC: 17 MMOL/L (ref 4–16)
BORDETELLA PERTUSSIS PCR: NOT DETECTED
BUN BLDV-MCNC: 27 MG/DL (ref 6–23)
CALCIUM SERPL-MCNC: 8.6 MG/DL (ref 8.3–10.6)
CHLAMYDOPHILA PNEUMONIA PCR: NOT DETECTED
CHLORIDE BLD-SCNC: 103 MMOL/L (ref 99–110)
CO2: 22 MMOL/L (ref 21–32)
CORONAVIRUS 229E PCR: NOT DETECTED
CORONAVIRUS HKU1 PCR: NOT DETECTED
CORONAVIRUS NL63 PCR: NOT DETECTED
CORONAVIRUS OC43 PCR: NOT DETECTED
CREAT SERPL-MCNC: 0.9 MG/DL (ref 0.9–1.3)
EKG ATRIAL RATE: 133 BPM
EKG DIAGNOSIS: NORMAL
EKG P AXIS: 35 DEGREES
EKG P-R INTERVAL: 190 MS
EKG Q-T INTERVAL: 350 MS
EKG QRS DURATION: 98 MS
EKG QTC CALCULATION (BAZETT): 520 MS
EKG R AXIS: -49 DEGREES
EKG T AXIS: 52 DEGREES
EKG VENTRICULAR RATE: 133 BPM
GFR AFRICAN AMERICAN: >60 ML/MIN/1.73M2
GFR NON-AFRICAN AMERICAN: >60 ML/MIN/1.73M2
GLUCOSE BLD-MCNC: 109 MG/DL (ref 70–99)
HUMAN METAPNEUMOVIRUS PCR: NOT DETECTED
INFLUENZA A BY PCR: NOT DETECTED
INFLUENZA A H1 (2009) PCR: NOT DETECTED
INFLUENZA A H1 PANDEMIC PCR: NOT DETECTED
INFLUENZA A H3 PCR: NOT DETECTED
INFLUENZA B BY PCR: NOT DETECTED
LEGIONELLA URINARY AG: NEGATIVE
LV EF: 58 %
LVEF MODALITY: NORMAL
MAGNESIUM: 1.9 MG/DL (ref 1.8–2.4)
MYCOPLASMA PNEUMONIAE PCR: NOT DETECTED
PARAINFLUENZA 1 PCR: NOT DETECTED
PARAINFLUENZA 2 PCR: NOT DETECTED
PARAINFLUENZA 3 PCR: NOT DETECTED
PARAINFLUENZA 4 PCR: NOT DETECTED
POTASSIUM SERPL-SCNC: 3.8 MMOL/L (ref 3.5–5.1)
PRO-BNP: 3076 PG/ML
RHINOVIRUS ENTEROVIRUS PCR: NOT DETECTED
RSV PCR: NOT DETECTED
SODIUM BLD-SCNC: 142 MMOL/L (ref 135–145)
STREP PNEUMONIAE ANTIGEN: NORMAL

## 2019-04-18 PROCEDURE — APPNB60 APP NON BILLABLE TIME 46-60 MINS: Performed by: NURSE PRACTITIONER

## 2019-04-18 PROCEDURE — 87449 NOS EACH ORGANISM AG IA: CPT

## 2019-04-18 PROCEDURE — 36415 COLL VENOUS BLD VENIPUNCTURE: CPT

## 2019-04-18 PROCEDURE — 6370000000 HC RX 637 (ALT 250 FOR IP): Performed by: NURSE PRACTITIONER

## 2019-04-18 PROCEDURE — B246ZZ4 ULTRASONOGRAPHY OF RIGHT AND LEFT HEART, TRANSESOPHAGEAL: ICD-10-PCS | Performed by: INTERNAL MEDICINE

## 2019-04-18 PROCEDURE — 93005 ELECTROCARDIOGRAM TRACING: CPT | Performed by: INTERNAL MEDICINE

## 2019-04-18 PROCEDURE — 83880 ASSAY OF NATRIURETIC PEPTIDE: CPT

## 2019-04-18 PROCEDURE — 6370000000 HC RX 637 (ALT 250 FOR IP): Performed by: HOSPITALIST

## 2019-04-18 PROCEDURE — 87899 AGENT NOS ASSAY W/OPTIC: CPT

## 2019-04-18 PROCEDURE — 6360000002 HC RX W HCPCS: Performed by: HOSPITALIST

## 2019-04-18 PROCEDURE — 2580000003 HC RX 258: Performed by: NURSE PRACTITIONER

## 2019-04-18 PROCEDURE — 93312 ECHO TRANSESOPHAGEAL: CPT | Performed by: INTERNAL MEDICINE

## 2019-04-18 PROCEDURE — 5A2204Z RESTORATION OF CARDIAC RHYTHM, SINGLE: ICD-10-PCS | Performed by: INTERNAL MEDICINE

## 2019-04-18 PROCEDURE — 83735 ASSAY OF MAGNESIUM: CPT

## 2019-04-18 PROCEDURE — 94150 VITAL CAPACITY TEST: CPT

## 2019-04-18 PROCEDURE — 2700000000 HC OXYGEN THERAPY PER DAY

## 2019-04-18 PROCEDURE — 87798 DETECT AGENT NOS DNA AMP: CPT

## 2019-04-18 PROCEDURE — 87581 M.PNEUMON DNA AMP PROBE: CPT

## 2019-04-18 PROCEDURE — 93312 ECHO TRANSESOPHAGEAL: CPT

## 2019-04-18 PROCEDURE — 2140000000 HC CCU INTERMEDIATE R&B

## 2019-04-18 PROCEDURE — 6360000002 HC RX W HCPCS: Performed by: NURSE PRACTITIONER

## 2019-04-18 PROCEDURE — 93010 ELECTROCARDIOGRAM REPORT: CPT | Performed by: INTERNAL MEDICINE

## 2019-04-18 PROCEDURE — 7100000000 HC PACU RECOVERY - FIRST 15 MIN

## 2019-04-18 PROCEDURE — 92960 CARDIOVERSION ELECTRIC EXT: CPT | Performed by: INTERNAL MEDICINE

## 2019-04-18 PROCEDURE — 7100000001 HC PACU RECOVERY - ADDTL 15 MIN

## 2019-04-18 PROCEDURE — 80048 BASIC METABOLIC PNL TOTAL CA: CPT

## 2019-04-18 PROCEDURE — 2500000003 HC RX 250 WO HCPCS: Performed by: NURSE PRACTITIONER

## 2019-04-18 PROCEDURE — 92960 CARDIOVERSION ELECTRIC EXT: CPT

## 2019-04-18 PROCEDURE — 87486 CHLMYD PNEUM DNA AMP PROBE: CPT

## 2019-04-18 PROCEDURE — 2580000003 HC RX 258: Performed by: INTERNAL MEDICINE

## 2019-04-18 PROCEDURE — 99223 1ST HOSP IP/OBS HIGH 75: CPT | Performed by: INTERNAL MEDICINE

## 2019-04-18 PROCEDURE — 94761 N-INVAS EAR/PLS OXIMETRY MLT: CPT

## 2019-04-18 PROCEDURE — 2500000003 HC RX 250 WO HCPCS: Performed by: INTERNAL MEDICINE

## 2019-04-18 RX ORDER — METOPROLOL TARTRATE 5 MG/5ML
5 INJECTION INTRAVENOUS ONCE
Status: COMPLETED | OUTPATIENT
Start: 2019-04-18 | End: 2019-04-18

## 2019-04-18 RX ORDER — METOPROLOL SUCCINATE 25 MG/1
25 TABLET, EXTENDED RELEASE ORAL 2 TIMES DAILY
Status: DISCONTINUED | OUTPATIENT
Start: 2019-04-18 | End: 2019-04-22 | Stop reason: HOSPADM

## 2019-04-18 RX ADMIN — ASPIRIN 81 MG 81 MG: 81 TABLET ORAL at 08:50

## 2019-04-18 RX ADMIN — CEFTRIAXONE 1 G: 1 INJECTION, POWDER, FOR SOLUTION INTRAMUSCULAR; INTRAVENOUS at 13:28

## 2019-04-18 RX ADMIN — ENOXAPARIN SODIUM 80 MG: 80 INJECTION SUBCUTANEOUS at 21:08

## 2019-04-18 RX ADMIN — GABAPENTIN 100 MG: 100 CAPSULE ORAL at 21:09

## 2019-04-18 RX ADMIN — ENOXAPARIN SODIUM 80 MG: 80 INJECTION SUBCUTANEOUS at 08:50

## 2019-04-18 RX ADMIN — DILTIAZEM HYDROCHLORIDE 360 MG: 180 CAPSULE, EXTENDED RELEASE ORAL at 08:50

## 2019-04-18 RX ADMIN — METOPROLOL TARTRATE 5 MG: 5 INJECTION, SOLUTION INTRAVENOUS at 15:39

## 2019-04-18 RX ADMIN — AMIODARONE HYDROCHLORIDE 0.5 MG/MIN: 50 INJECTION, SOLUTION INTRAVENOUS at 23:21

## 2019-04-18 RX ADMIN — CELECOXIB 200 MG: 200 CAPSULE ORAL at 21:08

## 2019-04-18 RX ADMIN — CELECOXIB 200 MG: 200 CAPSULE ORAL at 08:50

## 2019-04-18 RX ADMIN — AMIODARONE HYDROCHLORIDE 1 MG/MIN: 50 INJECTION, SOLUTION INTRAVENOUS at 15:05

## 2019-04-18 RX ADMIN — METOPROLOL SUCCINATE 12.5 MG: 25 TABLET, EXTENDED RELEASE ORAL at 08:50

## 2019-04-18 RX ADMIN — METOPROLOL SUCCINATE 25 MG: 25 TABLET, EXTENDED RELEASE ORAL at 21:08

## 2019-04-18 RX ADMIN — ACETAMINOPHEN 650 MG: 325 TABLET ORAL at 06:05

## 2019-04-18 RX ADMIN — SODIUM CHLORIDE, PRESERVATIVE FREE 10 ML: 5 INJECTION INTRAVENOUS at 21:09

## 2019-04-18 RX ADMIN — DILTIAZEM HYDROCHLORIDE 15 MG/HR: 5 INJECTION INTRAVENOUS at 10:53

## 2019-04-18 RX ADMIN — PANTOPRAZOLE SODIUM 40 MG: 40 TABLET, DELAYED RELEASE ORAL at 06:05

## 2019-04-18 RX ADMIN — PRAVASTATIN SODIUM 20 MG: 20 TABLET ORAL at 21:09

## 2019-04-18 RX ADMIN — LEVOTHYROXINE SODIUM 100 MCG: 100 TABLET ORAL at 06:05

## 2019-04-18 RX ADMIN — AZITHROMYCIN MONOHYDRATE 500 MG: 500 INJECTION, POWDER, LYOPHILIZED, FOR SOLUTION INTRAVENOUS at 18:35

## 2019-04-18 RX ADMIN — AMIODARONE HYDROCHLORIDE 150 MG: 50 INJECTION, SOLUTION INTRAVENOUS at 14:55

## 2019-04-18 RX ADMIN — FUROSEMIDE 20 MG: 10 INJECTION, SOLUTION INTRAVENOUS at 08:50

## 2019-04-18 ASSESSMENT — ENCOUNTER SYMPTOMS
DIARRHEA: 0
NAUSEA: 0
COUGH: 1
SINUS PRESSURE: 0
EYE REDNESS: 0
CONSTIPATION: 0
BACK PAIN: 0
SINUS PAIN: 0
VOMITING: 0
SORE THROAT: 0
SHORTNESS OF BREATH: 1
EYE ITCHING: 0
CHEST TIGHTNESS: 0
COLOR CHANGE: 0

## 2019-04-18 ASSESSMENT — PAIN DESCRIPTION - PAIN TYPE
TYPE: ACUTE PAIN

## 2019-04-18 ASSESSMENT — PAIN SCALES - GENERAL
PAINLEVEL_OUTOF10: 2
PAINLEVEL_OUTOF10: 2
PAINLEVEL_OUTOF10: 0

## 2019-04-18 ASSESSMENT — PAIN DESCRIPTION - ORIENTATION
ORIENTATION: LOWER
ORIENTATION: LOWER

## 2019-04-18 ASSESSMENT — PAIN DESCRIPTION - LOCATION
LOCATION: BACK

## 2019-04-18 ASSESSMENT — PAIN DESCRIPTION - DESCRIPTORS
DESCRIPTORS: ACHING
DESCRIPTORS: ACHING

## 2019-04-18 ASSESSMENT — PAIN DESCRIPTION - FREQUENCY
FREQUENCY: INTERMITTENT
FREQUENCY: INTERMITTENT

## 2019-04-18 NOTE — CARE COORDINATION
.CM met with pt for d/c planning. Introduced self and updated white board. Pt lives with spouse and is independent with ADL's. Pt drives, has a PCP, has insurance, and is able to afford medication. Pt denies having any DME or services. [facility] offered and pt refused. Pt denies any needs at this time. D/c plan is home with spouse, no needs. CM will continue to follow.   TE

## 2019-04-18 NOTE — CONSULTS
Electrophysiology Consult Note      Reason for consultation: atrial tachycardia    Chief complaint: shortness of breath and fatigue    Referring physician:  Dr Darrian Edmonds    Primary care physician: Álvaro Ellington MD      History of Present Illness:   Gris Talamantes is a 75year old male who presented to the Emergency Department with complaints of shortness of breath both at rest and with exertion. He additionally complains of fatigue. He states that the symptoms started about 2 weeks ago and have progressively become worse. He has been seen in the Cardiology office and had a scheduled appointment with Electrophysiology at the end of this week. However his symptoms became worse and he came to the hospital.  He had an atrial fibrillation ablation in 2016. He stated that he recently had a respiratory infection and was treated by his PCP. He has a past medical history of ,S/P Ablation of Atrial Fibrillation, Persistent Atrial Fibrillation, HLD, Stroke    Past medical history:   Past Medical History:   Diagnosis Date    Aneurysm ([facility] [address]     12 YEARS AGO, NON SURGICAL    Arthritis     Atrial fibrillation (HCC)     H/O 24 hour EKG monitoring 05-    PRED. RHYTHM SR W/INFREQUENT VENT. AND SUPRAVENT ECTOPY W/SHORT RUNS OF SVT. LONG RUN 10 BEATS.    H/O cardiovascular stress test 05-    [facility]) NORMAL. EF 56%.  H/O cardiovascular stress test 6/6/2016    lexiscan-normal,EF51%    H/O echocardiogram 05-    EF 55%. MILD CONCENTRIC LEFT VENT. HYPERTROPHY. MILD AORTIC VALVE CALCIFICATION. MILD VALVULAR AORTIC STENOSIS. MOD. AORTIC REGURG.  MILD AORTIC ROOT DILATION.   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-    PARS PLA[place] VITRECTOMY LEFT EYE    Thyroid disease HYPOTHYROIDISM       Surgical history :  Past Surgical History:   Procedure Laterality Date    ABLATION OF DYSRHYTHMIC FOCUS      ATRIAL ABLATION SURGERY N/A 08/16/2016    atrial fibrillation ablation    BRAIN ANEURYSM SURGERY      has coil    CATARACT REMOVAL  2012    HERNIA REPAIR      LEFT    KNEE SURGERY  1977    knee  left    KNEE SURGERY      VITRECTOMY  05-    LEFT EYE        Family history:   Family History   Problem Relation Age of Onset    High Blood Pressure Mother        Social history :  reports that he has never smoked. He has never used smokeless tobacco. He reports that he does not drink alcohol or use drugs. Allergies   Allergen Reactions    Codeine Nausea And Vomiting    Vicodin [Hydrocodone-Acetaminophen] Nausea And Vomiting       No current facility-administered medications on file prior to encounter. Current Outpatient Medications on File Prior to Encounter   Medication Sig Dispense Refill    metoprolol succinate (TOPROL XL) 25 MG extended release tablet Take 0.5 tablets by mouth daily 15 tablet 0    apixaban (ELIQUIS) 5 MG TABS tablet Take one tablet twice daily 56 tablet 0    pravastatin (PRAVACHOL) 20 MG tablet Take 1 tablet by mouth daily (Patient taking differently: Take 20 mg by mouth nightly ) 30 tablet 5    diltiazem (CARDIZEM CD) 360 MG extended release capsule TAKE ONE (1) CAPSULE BY MOUTH ONCE DAILY 30 capsule 9    traMADol-acetaminophen (ULTRACET) 37.5-325 MG per tablet 1 tablet every 4 hours as needed       omeprazole (PRILOSEC) 40 MG capsule Take 40 mg by mouth daily      ferrous sulfate 325 (65 FE) MG tablet Take 325 mg by mouth daily (with breakfast)      gabapentin (NEURONTIN) 100 MG capsule Take 100 mg by mouth nightly as needed       UNABLE TO FIND Venancio B-150      diclofenac sodium 1 % GEL Apply 2 g topically daily 04/16/19 Applies to lower back      multivitamin (THERAGRAN) per tablet Take 1 tablet by mouth daily.         vitamin E 400 UNIT capsule Take 400 Units by mouth daily.  vitamin B-12 (CYANOCOBALAMIN) 100 MCG tablet Take 50 mcg by mouth daily.  celecoxib (CELEBREX) 200 MG capsule Take 200 mg by mouth 2 times daily.  levothyroxine (SYNTHROID) 100 MCG tablet Take 100 mcg by mouth daily.  aspirin 81 MG chewable tablet Take 81 mg by mouth daily.  [DISCONTINUED] amLODIPine (NORVASC) 5 MG tablet Take 1 tablet by mouth daily. 14 tablet 0       Review of Systems:   Review of Systems   Constitutional: Positive for fatigue. Negative for appetite change. HENT: Negative for congestion, sinus pressure, sinus pain and sore throat. Eyes: Negative for redness and itching. Respiratory: Positive for cough and shortness of breath. Negative for chest tightness. Cardiovascular: Negative for chest pain, palpitations and leg swelling. Gastrointestinal: Negative for constipation, diarrhea, nausea and vomiting. Genitourinary: Negative for difficulty urinating and dysuria. Musculoskeletal: Positive for arthralgias. Negative for back pain and gait problem. Skin: Negative for color change, pallor, rash and wound. Neurological: Negative for dizziness, syncope and light-headedness. Psychiatric/Behavioral: Negative for confusion. The patient is not nervous/anxious. Physical Examination:    /64   Pulse 82   Temp 98.5 °F (36.9 °C) (Oral)   Resp 26   Ht 5' 6\" (1.676 m)   Wt 171 lb 15.3 oz (78 kg)   SpO2 93%   BMI 27.75 kg/m²    Wt Readings from Last 3 Encounters:   04/18/19 171 lb 15.3 oz (78 kg)   04/15/19 179 lb 6.4 oz (81.4 kg)   07/24/18 184 lb 6.4 oz (83.6 kg)     Body mass index is 27.75 kg/m². Physical Exam   Constitutional: He is oriented to person, place, and time. He appears well-developed and well-nourished. HENT:   Head: Normocephalic and atraumatic. Eyes: Pupils are equal, round, and reactive to light. Conjunctivae are normal. Right eye exhibits no discharge.  Left eye exhibits no discharge. Neck: Normal range of motion. No JVD present. No thyromegaly present. Cardiovascular: Normal heart sounds and intact distal pulses. An irregular rhythm present. Tachycardia present. Exam reveals no gallop and no friction rub. No murmur heard. Pulmonary/Chest: No respiratory distress. He has wheezes. He has rales. Abdominal: Soft. Bowel sounds are normal. He exhibits no distension. There is no tenderness. Musculoskeletal: He exhibits no edema or deformity. Neurological: He is alert and oriented to person, place, and time. Skin: Skin is warm and dry. Psychiatric: Judgment and thought content normal.       CBC:   Lab Results   Component Value Date    WBC 13.9 04/16/2019    HGB 12.2 04/16/2019    HCT 39.2 04/16/2019     04/16/2019     Lipids:   Lab Results   Component Value Date    CHOL 101 04/17/2019    TRIG 66 04/17/2019    HDL 48 04/17/2019    LDLCALC 50 04/09/2019    LDLDIRECT 52 04/17/2019     PT/INR:   Lab Results   Component Value Date    INR 1.79 04/16/2019        BMP:    Lab Results   Component Value Date     04/18/2019    K 3.8 04/18/2019     04/18/2019    CO2 22 04/18/2019    BUN 27 (H) 04/18/2019     CMP:   Lab Results   Component Value Date    AST 28 04/16/2019    ALT 14 04/16/2019    PROT 7.2 04/16/2019    BILITOT 0.7 04/16/2019    ALKPHOS 87 04/16/2019     TSH:    Lab Results   Component Value Date    TSH 0.722 04/09/2019       EKG Interpretation:        IMPRESSION / RECOMMENDATIONS:     Atrial Tachycardia  S/P Ablation of Atrial Fibrillation  Persistent Atrial Fibrillation  HLD  History of stroke  ?  Pneumonia vs aute diastolic heart failure    EKG reviewed  Will start amiodarone IV loading  Once amiodarone loading completed,   start amiodarone 200mg BID for 10 days,   then continue amiodarone 200mg daily  Will plan for cardioversion later today  Remain NPO  Discussed with patient, voiced understanding    Thanks again for allowing me to participate in care

## 2019-04-18 NOTE — PROGRESS NOTES
Divina Jackson NP updated on pt being NPO and continuing to tach up into the 130s inspite of cardizem gtt being at 15 ml/hr. She recommends maintaining NPO status and will evaluate tachycardia.

## 2019-04-18 NOTE — PROGRESS NOTES
[address] [place] [address]  HOSPITALIST PROGRESS NOTE                       Name:  Campbell Dumont /Age/Sex: 1945  (75 y.o. male)   MRN & CSN:  [id number] & [id number] Admission Date/Time: 2019  1:50 PM   Location:  [address] [place] [address]- Attending:  Asmita Low MD                                                  HPI  Campbell Dumont is a 75 y.o. male who presents shortness of breath/atrial fib with RVR    SUBJECTIVE  - feeling less short of breath and overall better, still on [place] oxygen, needed higher amount of cardizem drip    10 point review of systems reviewed and negative unless noted above. ALLERGIES:   Allergies   Allergen Reactions    Codeine Nausea And Vomiting    Vicodin [Hydrocodone-Acetaminophen] Nausea And Vomiting       PCP: Evelina Hinson MD    PAST MEDICAL HISTORY, SURGICAL HISTORY, SOCIAL HISTORY and  HOME MEDICATIONS all reviewed. OBJECTIVE  Vitals:    19 0545 19 0850 19 0954 19 1051   BP: (!) 125/90 113/77  (!) 125/91   Pulse: 108 118 125 127   Resp: 29 26     Temp: 98.9 °F (37.2 °C) 98.5 °F (36.9 °C)     TempSrc: Rectal Oral     SpO2: 95% 91%     Weight: 171 lb 15.3 oz (78 kg)      Height:           PHYSICAL EXAM   GEN Awake male, sitting upright in bed in no apparent distress. Appears given age. EYES Pupils are equally round. No scleral erythema, discharge, or conjunctivitis. HENT Mucous membranes are moist. Oral pharynx without exudates, no evidence of thrush. NECK Supple, no apparent thyromegaly or masses. RESP Unlabored respiration, bilateral rhonchi  CARDIO/VASC S1/S2 auscultated. Regular rate without appreciable murmurs, rubs, or gallops. No JVD or carotid bruits. Peripheral pulses equal bilaterally and palpable. GI Abdomen is soft without significant tenderness, masses, or guarding. Bowel sounds are normoactive. Rectal exam deferred.  No costovertebral angle tenderness. Normal appearing external genitalia.  Solorzano catheter is not present. HEME/LYMPH No palpable cervical lymphadenopathy and no hepatosplenomegaly. No petechiae or ecchymoses. MSK Spontaneous movement of all extremities. No gross joint deformities. SKIN Normal coloration, warm, dry. NEURO Cranial nerves appear grossly intact, normal speech, no lateralizing weakness. PSYCH Awake, alert, oriented x 4. Affect appropriate. INTAKE: No intake/output data recorded. OUTPUT: No intake/output data recorded. LABS  Recent Labs     04/16/19  1352   WBC 13.9*   HGB 12.2*   HCT 39.2*         Recent Labs     04/16/19  1352 04/17/19  0401 04/18/19  0401    141 142   K 4.0 3.9 3.8    106 103   CO2 19* 19* 22   BUN 21 20 27*   CREATININE 0.8* 0.9 0.9     Recent Labs     04/16/19  1352   AST 28   ALT 14   BILITOT 0.7   ALKPHOS 87     Recent Labs     04/16/19  1352   INR 1.79     Recent Labs     04/16/19  1352 04/16/19  1919 04/17/19  0401   TROPONINT <0.010 <0.010 <0.010          Abnormal labs for today noted      Imaging: CT-  Impression   1. No evidence of pulmonary embolic disease. 2. Multifocal ground-glass opacification throughout the lungs.  The findings   are nonspecific with an infectious or inflammatory pneumonitis favored versus   edema and heart failure.  No pleural fluid. 3. Cardiomegaly. 4. Mildly enlarged main pulmonary artery indicative of pulmonary hypertension.    5. Ascending aortic ectasia at 4.4 cm maximally.             ECHO: pending    Microbiology:  Blood culture:    Urine culture:    Sputum culture:    Procedures done this admission:    MEDS  Scheduled Meds:   celecoxib  200 mg Oral BID    enoxaparin  1 mg/kg Subcutaneous BID    sodium chloride flush  10 mL Intravenous 2 times per day    aspirin  81 mg Oral Daily    diltiazem  360 mg Oral Daily    gabapentin  100 mg Oral Nightly    levothyroxine  100 mcg Oral QAM AC    metoprolol succinate  12.5 mg Oral Daily    pravastatin  20 mg Oral Nightly    pantoprazole  40 mg Oral QAM AC  furosemide  20 mg Intravenous Daily    cefTRIAXone (ROCEPHIN) IV  1 g Intravenous Q24H    azithromycin  500 mg Intravenous Q24H     Continuous Infusions:   diltiazem (CARDIZEM) 125 mg in dextrose 5% 125 mL infusion 15 mg/hr (04/18/19 1053)     PRN Meds:sodium chloride flush, magnesium hydroxide, ondansetron, acetaminophen, nitroGLYCERIN, magnesium sulfate        ASSESSMENT and PLAN  Hospital Day: 3    1-Shortness of breath- noted CT concerning for bilateral infiltrates- concerning for bilateral pneumonia vs acute CHF from atrial fib with RVR- treat for both at this time- wean oxygen off after HR is controlled  2-atrial fib with RVR- likely precipitated by above- cardizem drip and AC- awaiting EPS evaluation  3-SIRS- with leukocytosis and tachycardia- rule out sepsis- blood culture ordered    Other chronic conditions  -HTN  -Hypothyroidism  -OA               Disp:     Diet DIET CARDIAC;   Dietary Nutrition Supplements: Low Calorie High Protein Supplement   DVT Prophylaxis [x] Lovenox, []  Heparin, [] SCDs, [] Ambulation   GI Prophylaxis [] PPI,  [] H2 Blocker,  [] Carafate,  [] Diet/Tube Feeds   Code Status Full Code   Disposition Patient requires continued admission due to atrial fib with RVR   CMS Level of Risk [] Low, [] Moderate,[x]  High  Patient's risk as above due to atrial fib with RVR     AYUSH RDZ MD 4/18/2019 10:56 AM

## 2019-04-19 LAB
ALBUMIN SERPL-MCNC: 3 GM/DL (ref 3.4–5)
ANION GAP SERPL CALCULATED.3IONS-SCNC: 16 MMOL/L (ref 4–16)
BUN BLDV-MCNC: 39 MG/DL (ref 6–23)
CALCIUM SERPL-MCNC: 8.4 MG/DL (ref 8.3–10.6)
CHLORIDE BLD-SCNC: 102 MMOL/L (ref 99–110)
CO2: 22 MMOL/L (ref 21–32)
CREAT SERPL-MCNC: 1.1 MG/DL (ref 0.9–1.3)
EKG ATRIAL RATE: 83 BPM
EKG ATRIAL RATE: 93 BPM
EKG DIAGNOSIS: NORMAL
EKG DIAGNOSIS: NORMAL
EKG P AXIS: -6 DEGREES
EKG P AXIS: 46 DEGREES
EKG P-R INTERVAL: 194 MS
EKG P-R INTERVAL: 216 MS
EKG Q-T INTERVAL: 368 MS
EKG Q-T INTERVAL: 416 MS
EKG QRS DURATION: 100 MS
EKG QRS DURATION: 96 MS
EKG QTC CALCULATION (BAZETT): 488 MS
EKG QTC CALCULATION (BAZETT): 515 MS
EKG R AXIS: -42 DEGREES
EKG R AXIS: -53 DEGREES
EKG T AXIS: 41 DEGREES
EKG T AXIS: 48 DEGREES
EKG VENTRICULAR RATE: 118 BPM
EKG VENTRICULAR RATE: 83 BPM
GFR AFRICAN AMERICAN: >60 ML/MIN/1.73M2
GFR NON-AFRICAN AMERICAN: >60 ML/MIN/1.73M2
GLUCOSE BLD-MCNC: 105 MG/DL (ref 70–99)
HCT VFR BLD CALC: 39.9 % (ref 42–52)
HEMOGLOBIN: 12.3 GM/DL (ref 13.5–18)
MAGNESIUM: 1.8 MG/DL (ref 1.8–2.4)
MCH RBC QN AUTO: 31.4 PG (ref 27–31)
MCHC RBC AUTO-ENTMCNC: 30.8 % (ref 32–36)
MCV RBC AUTO: 101.8 FL (ref 78–100)
PDW BLD-RTO: 12.4 % (ref 11.7–14.9)
PHOSPHORUS: 4.1 MG/DL (ref 2.5–4.9)
PLATELET # BLD: 377 K/CU MM (ref 140–440)
PMV BLD AUTO: 9.8 FL (ref 7.5–11.1)
POTASSIUM SERPL-SCNC: 3.8 MMOL/L (ref 3.5–5.1)
RBC # BLD: 3.92 M/CU MM (ref 4.6–6.2)
SODIUM BLD-SCNC: 140 MMOL/L (ref 135–145)
WBC # BLD: 13.4 K/CU MM (ref 4–10.5)

## 2019-04-19 PROCEDURE — 36415 COLL VENOUS BLD VENIPUNCTURE: CPT

## 2019-04-19 PROCEDURE — 6370000000 HC RX 637 (ALT 250 FOR IP): Performed by: HOSPITALIST

## 2019-04-19 PROCEDURE — 6370000000 HC RX 637 (ALT 250 FOR IP): Performed by: NURSE PRACTITIONER

## 2019-04-19 PROCEDURE — 85027 COMPLETE CBC AUTOMATED: CPT

## 2019-04-19 PROCEDURE — 2580000003 HC RX 258: Performed by: NURSE PRACTITIONER

## 2019-04-19 PROCEDURE — 99232 SBSQ HOSP IP/OBS MODERATE 35: CPT | Performed by: INTERNAL MEDICINE

## 2019-04-19 PROCEDURE — 2580000003 HC RX 258: Performed by: HOSPITALIST

## 2019-04-19 PROCEDURE — 94761 N-INVAS EAR/PLS OXIMETRY MLT: CPT

## 2019-04-19 PROCEDURE — 2140000000 HC CCU INTERMEDIATE R&B

## 2019-04-19 PROCEDURE — 80069 RENAL FUNCTION PANEL: CPT

## 2019-04-19 PROCEDURE — 83735 ASSAY OF MAGNESIUM: CPT

## 2019-04-19 PROCEDURE — 93010 ELECTROCARDIOGRAM REPORT: CPT | Performed by: INTERNAL MEDICINE

## 2019-04-19 PROCEDURE — 2700000000 HC OXYGEN THERAPY PER DAY

## 2019-04-19 PROCEDURE — 99231 SBSQ HOSP IP/OBS SF/LOW 25: CPT | Performed by: INTERNAL MEDICINE

## 2019-04-19 PROCEDURE — 6360000002 HC RX W HCPCS: Performed by: HOSPITALIST

## 2019-04-19 PROCEDURE — 6360000002 HC RX W HCPCS: Performed by: NURSE PRACTITIONER

## 2019-04-19 RX ORDER — AMIODARONE HYDROCHLORIDE 200 MG/1
200 TABLET ORAL 2 TIMES DAILY
Status: DISCONTINUED | OUTPATIENT
Start: 2019-04-19 | End: 2019-04-22 | Stop reason: HOSPADM

## 2019-04-19 RX ADMIN — ASPIRIN 81 MG 81 MG: 81 TABLET ORAL at 08:40

## 2019-04-19 RX ADMIN — CEFTRIAXONE 1 G: 1 INJECTION, POWDER, FOR SOLUTION INTRAMUSCULAR; INTRAVENOUS at 14:51

## 2019-04-19 RX ADMIN — FUROSEMIDE 20 MG: 10 INJECTION, SOLUTION INTRAVENOUS at 08:39

## 2019-04-19 RX ADMIN — METOPROLOL SUCCINATE 25 MG: 25 TABLET, EXTENDED RELEASE ORAL at 08:40

## 2019-04-19 RX ADMIN — CELECOXIB 200 MG: 200 CAPSULE ORAL at 21:42

## 2019-04-19 RX ADMIN — ENOXAPARIN SODIUM 80 MG: 80 INJECTION SUBCUTANEOUS at 21:42

## 2019-04-19 RX ADMIN — PANTOPRAZOLE SODIUM 40 MG: 40 TABLET, DELAYED RELEASE ORAL at 06:45

## 2019-04-19 RX ADMIN — AMIODARONE HYDROCHLORIDE 200 MG: 200 TABLET ORAL at 14:51

## 2019-04-19 RX ADMIN — CEFEPIME HYDROCHLORIDE 2 G: 2 INJECTION, POWDER, FOR SOLUTION INTRAVENOUS at 17:53

## 2019-04-19 RX ADMIN — SODIUM CHLORIDE, PRESERVATIVE FREE 10 ML: 5 INJECTION INTRAVENOUS at 08:39

## 2019-04-19 RX ADMIN — ENOXAPARIN SODIUM 80 MG: 80 INJECTION SUBCUTANEOUS at 08:40

## 2019-04-19 RX ADMIN — PRAVASTATIN SODIUM 20 MG: 20 TABLET ORAL at 21:41

## 2019-04-19 RX ADMIN — GABAPENTIN 100 MG: 100 CAPSULE ORAL at 21:41

## 2019-04-19 RX ADMIN — ACETAMINOPHEN 650 MG: 325 TABLET ORAL at 21:41

## 2019-04-19 RX ADMIN — METOPROLOL SUCCINATE 25 MG: 25 TABLET, EXTENDED RELEASE ORAL at 21:42

## 2019-04-19 RX ADMIN — CELECOXIB 200 MG: 200 CAPSULE ORAL at 08:40

## 2019-04-19 RX ADMIN — DILTIAZEM HYDROCHLORIDE 360 MG: 180 CAPSULE, EXTENDED RELEASE ORAL at 08:40

## 2019-04-19 RX ADMIN — SODIUM CHLORIDE, PRESERVATIVE FREE 10 ML: 5 INJECTION INTRAVENOUS at 21:41

## 2019-04-19 RX ADMIN — LEVOTHYROXINE SODIUM 100 MCG: 100 TABLET ORAL at 06:45

## 2019-04-19 ASSESSMENT — PAIN SCALES - GENERAL
PAINLEVEL_OUTOF10: 0

## 2019-04-19 NOTE — PROGRESS NOTES
Admit Date:  4/16/2019    Admission diagnosis / Complaint :  Atrial tachycardia      Subjective:  Mr. Xena Chavez is feeling much better today    Objective:   /75   Pulse 96   Temp 96.8 °F (36 °C) (Oral)   Resp 21   Ht 5' 6\" (1.676 m)   Wt 171 lb 15.3 oz (78 kg)   SpO2 (!) 89%   BMI 27.75 kg/m²       Intake/Output Summary (Last 24 hours) at 4/19/2019 1617  Last data filed at 4/19/2019 6157  Gross per 24 hour   Intake --   Output 100 ml   Net -100 ml       TELEMETRY: Sinus  With PAC and PVC   has a past medical history of Aneurysm ([facility] [address] Arthritis, Atrial fibrillation ([facility] [address] H/O 24 hour EKG monitoring, H/O cardiovascular stress test, H/O cardiovascular stress test, H/O echocardiogram, H/O gastroesophageal reflux (GERD), H/O prior ablation treatment, History of atrial fibrillation, Hx-TIA (transient ischemic attack), Hyperlipidemia, Hypertension, Migraine, Rotator cuff injury, Terson's syndrome ([address] and Thyroid disease. has a past surgical history that includes Brain aneurysm surgery; knee surgery (1977); knee surgery; hernia repair; vitrectomy (05-); Cataract removal (2012); Atrial ablation surgery (N/A, 08/16/2016); and ablation of dysrhythmic focus.   Physical Exam:  General:  Awake, alert, NAD  Skin:  Warm and dry  Neck:  JVD NONE  Chest:  rales  Cardiovascular:  RRR S1S2, grade 2/6 systolic and diastolic murmur  Abdomen:  Soft non tender  Extremities:  no edema    Medications:    amiodarone  200 mg Oral BID    cefepime  2 g Intravenous Q12H    metoprolol succinate  25 mg Oral BID    celecoxib  200 mg Oral BID    enoxaparin  1 mg/kg Subcutaneous BID    sodium chloride flush  10 mL Intravenous 2 times per day    aspirin  81 mg Oral Daily    diltiazem  360 mg Oral Daily    gabapentin  100 mg Oral Nightly    levothyroxine  100 mcg Oral QAM AC    pravastatin  20 mg Oral Nightly    pantoprazole  40 mg Oral QAM AC    furosemide  20 mg Intravenous Daily      amiodarone 0.5 mg/min

## 2019-04-19 NOTE — PLAN OF CARE
Problem: Falls - Risk of:  Goal: Will remain free from falls  Description  Will remain free from falls  4/18/2019 2349 by Naeem Ruby RN  Outcome: Ongoing  4/18/2019 1202 by Sandy Salmeron RN  Outcome: Ongoing  Goal: Absence of physical injury  Description  Absence of physical injury  4/18/2019 2349 by Naeem Ruby RN  Outcome: Ongoing  4/18/2019 1202 by Sandy Salmeron RN  Outcome: Ongoing     Problem: Breathing Pattern - Ineffective:  Goal: Ability to achieve and maintain a regular respiratory rate will improve  Description  Ability to achieve and maintain a regular respiratory rate will improve  4/18/2019 2349 by Naeem Ruby RN  Outcome: Ongoing  4/18/2019 1202 by Sandy Salmeron RN  Outcome: Ongoing     Problem: Pain:  Goal: Pain level will decrease  Description  Pain level will decrease  4/18/2019 2349 by Naeem Ruby RN  Outcome: Ongoing  4/18/2019 1202 by Sandy Salmeron RN  Outcome: Ongoing  Goal: Control of acute pain  Description  Control of acute pain  4/18/2019 2349 by Naeem Ruby RN  Outcome: Ongoing  4/18/2019 1202 by Sandy Salmeron RN  Outcome: Ongoing  Goal: Control of chronic pain  Description  Control of chronic pain  4/18/2019 2349 by Naeem Ruby RN  Outcome: Ongoing  4/18/2019 1202 by Sandy Salmeron RN  Outcome: Ongoing

## 2019-04-19 NOTE — PROGRESS NOTES
[facility]  HOSPITALIST PROGRESS NOTE                       Name:  Jass Lawson /Age/Sex: 1945  (75 y.o. male)   MRN & CSN:  [id number] & [id number] Admission Date/Time: 2019  1:50 PM   Location:  [address] [place] [address] Attending:  Merline David MD                                                  HPI  Jass Lawson is a 75 y.o. male who presents shortness of breath/atrial fib with RVR    SUBJECTIVE  -overall feels better, no chest pain, or shortness of breath. On amiodarone drip    10 point review of systems reviewed and negative unless noted above. ALLERGIES:   Allergies   Allergen Reactions    Codeine Nausea And Vomiting    Vicodin [Hydrocodone-Acetaminophen] Nausea And Vomiting       PCP: Aby Malin MD    PAST MEDICAL HISTORY, SURGICAL HISTORY, SOCIAL HISTORY and  HOME MEDICATIONS all reviewed. OBJECTIVE  Vitals:    19 1526 19 2005 19 0630 19 0803   BP: 111/64 96/62 89/74 116/86   Pulse: 82 103 85 102   Resp: 26 (!) 33 23 28   Temp:  98.6 °F (37 °C) 98.7 °F (37.1 °C) 98.7 °F (37.1 °C)   TempSrc:  Oral Oral Oral   SpO2: 93% 93%  96%   Weight:       Height:           PHYSICAL EXAM   GEN Awake male, sitting upright in bed in no apparent distress. Appears given age. EYES Pupils are equally round. No scleral erythema, discharge, or conjunctivitis. HENT Mucous membranes are moist. Oral pharynx without exudates, no evidence of thrush. NECK Supple, no apparent thyromegaly or masses. RESP Unlabored respiration, bilateral rhonchi  CARDIO/VASC S1/S2 auscultated. Regular rate without appreciable murmurs, rubs, or gallops. No JVD or carotid bruits. Peripheral pulses equal bilaterally and palpable. GI Abdomen is soft without significant tenderness, masses, or guarding. Bowel sounds are normoactive. Rectal exam deferred.  No costovertebral angle tenderness. Normal appearing external genitalia. Solorzano catheter is not present.   HEME/LYMPH No palpable cervical lymphadenopathy and no hepatosplenomegaly. No petechiae or ecchymoses. MSK Spontaneous movement of all extremities. No gross joint deformities. SKIN Normal coloration, warm, dry. NEURO Cranial nerves appear grossly intact, normal speech, no lateralizing weakness. PSYCH Awake, alert, oriented x 4. Affect appropriate. INTAKE: In: -   Out: 100   OUTPUT: In: -   Out: 100 [Urine:100]    LABS  Recent Labs     04/16/19  1352 04/19/19  0347   WBC 13.9* 13.4*   HGB 12.2* 12.3*   HCT 39.2* 39.9*    377      Recent Labs     04/17/19  0401 04/18/19  0401 04/19/19  0347    142 140   K 3.9 3.8 3.8    103 102   CO2 19* 22 22   PHOS  --   --  4.1   BUN 20 27* 39*   CREATININE 0.9 0.9 1.1     Recent Labs     04/16/19  1352   AST 28   ALT 14   BILITOT 0.7   ALKPHOS 87     Recent Labs     04/16/19  1352   INR 1.79     Recent Labs     04/16/19  1352 04/16/19  1919 04/17/19  0401   TROPONINT <0.010 <0.010 <0.010          Abnormal labs for today noted      Imaging: CT-  Impression   1. No evidence of pulmonary embolic disease. 2. Multifocal ground-glass opacification throughout the lungs.  The findings   are nonspecific with an infectious or inflammatory pneumonitis favored versus   edema and heart failure.  No pleural fluid. 3. Cardiomegaly. 4. Mildly enlarged main pulmonary artery indicative of pulmonary hypertension.    5. Ascending aortic ectasia at 4.4 cm maximally.             ECHO: Summary   Left ventricular systolic function is normal.   Ejection fraction is visually estimated at 55-60%.   Unable to assess diastolic dysfunction due to atrial fibrillation.   Moderate aortic insufficiency with PHT of 303 msec.   Mild aortic stenosis with a mean gradient of 9 mmHg.   Moderate tricuspid regurgitation with RVSP of 45 mmHg.   No evidence of any pericardial effusion.   Aortic root is dilated     Microbiology:  Blood culture:    Urine culture:    Sputum culture:    Procedures done this

## 2019-04-19 NOTE — PROCEDURES
SYNCHRONIZED  CARDIOVERSION     After sedation/ROBB and anesthesia with versed, d/c synchronized cardioversion was performed with 200J 3 times without success, Finally with EP help 2 defibrillators were used to cardiovert him successdull    Impression: Patient is successfully cardioverted into NSR.     PLAN: continue anticoagulation and amiodarone drip    ASA: mallampati: 3:3

## 2019-04-19 NOTE — PROGRESS NOTES
Today's plan:  contineu present care, s/p cardioversion      Admit Date:  4/16/2019    Subjective:better      Chief complaints on admission  Chief Complaint   Patient presents with    Shortness of Breath     increasing shortness of breath, worse with any exertion; seen by Dr. Marlin Cotton yesterday; oxgen sat in triage is 88-89% on room air     Fatigue     increasing fatigue     Emesis     pt reports one episode of emesis this am; denies abdominal pain    Tachycardia     pt told he was in a.fib in the office, pt history of a.fib and cardioversion with Dr. Shasta Dominguez; pt is on eliquis         History of present illness:Toni is a 75 y. o.year old who  presents with had concerns including Shortness of Breath (increasing shortness of breath, worse with any exertion; seen by Dr. Marlin Cotton yesterday; oxgen sat in triage is 88-89% on room air ); Fatigue (increasing fatigue ); Emesis (pt reports one episode of emesis this am; denies abdominal pain); and Tachycardia (pt told he was in a.fib in the office, pt history of a.fib and cardioversion with Dr. Shasta Dominguez; pt is on eliquis). 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 Past surgical history:   has a past surgical history that includes Brain aneurysm surgery; knee surgery (1977); knee surgery; hernia repair; vitrectomy (05-); Cataract removal (2012); Atrial ablation surgery (N/A, 08/16/2016); and ablation of dysrhythmic focus. Social History:   reports that he has never smoked.  He has never used smokeless tobacco. He reports that he does not drink alcohol or

## 2019-04-20 LAB
ANION GAP SERPL CALCULATED.3IONS-SCNC: 13 MMOL/L (ref 4–16)
BUN BLDV-MCNC: 37 MG/DL (ref 6–23)
CALCIUM SERPL-MCNC: 8.8 MG/DL (ref 8.3–10.6)
CHLORIDE BLD-SCNC: 103 MMOL/L (ref 99–110)
CO2: 26 MMOL/L (ref 21–32)
CREAT SERPL-MCNC: 0.9 MG/DL (ref 0.9–1.3)
GFR AFRICAN AMERICAN: >60 ML/MIN/1.73M2
GFR NON-AFRICAN AMERICAN: >60 ML/MIN/1.73M2
GLUCOSE BLD-MCNC: 109 MG/DL (ref 70–99)
MAGNESIUM: 1.8 MG/DL (ref 1.8–2.4)
POTASSIUM SERPL-SCNC: 3.6 MMOL/L (ref 3.5–5.1)
PRO-BNP: 564.1 PG/ML
SODIUM BLD-SCNC: 142 MMOL/L (ref 135–145)

## 2019-04-20 PROCEDURE — 2580000003 HC RX 258: Performed by: NURSE PRACTITIONER

## 2019-04-20 PROCEDURE — 2140000000 HC CCU INTERMEDIATE R&B

## 2019-04-20 PROCEDURE — 6370000000 HC RX 637 (ALT 250 FOR IP): Performed by: HOSPITALIST

## 2019-04-20 PROCEDURE — 6370000000 HC RX 637 (ALT 250 FOR IP): Performed by: NURSE PRACTITIONER

## 2019-04-20 PROCEDURE — 99232 SBSQ HOSP IP/OBS MODERATE 35: CPT | Performed by: INTERNAL MEDICINE

## 2019-04-20 PROCEDURE — 2580000003 HC RX 258: Performed by: HOSPITALIST

## 2019-04-20 PROCEDURE — 6360000002 HC RX W HCPCS: Performed by: HOSPITALIST

## 2019-04-20 PROCEDURE — 87324 CLOSTRIDIUM AG IA: CPT

## 2019-04-20 PROCEDURE — 36415 COLL VENOUS BLD VENIPUNCTURE: CPT

## 2019-04-20 PROCEDURE — 83880 ASSAY OF NATRIURETIC PEPTIDE: CPT

## 2019-04-20 PROCEDURE — 83735 ASSAY OF MAGNESIUM: CPT

## 2019-04-20 PROCEDURE — 80048 BASIC METABOLIC PNL TOTAL CA: CPT

## 2019-04-20 RX ORDER — POTASSIUM CHLORIDE 20 MEQ/1
10 TABLET, EXTENDED RELEASE ORAL 2 TIMES DAILY WITH MEALS
Status: DISCONTINUED | OUTPATIENT
Start: 2019-04-20 | End: 2019-04-22 | Stop reason: HOSPADM

## 2019-04-20 RX ORDER — FUROSEMIDE 20 MG/1
20 TABLET ORAL DAILY
Status: DISCONTINUED | OUTPATIENT
Start: 2019-04-20 | End: 2019-04-22 | Stop reason: HOSPADM

## 2019-04-20 RX ORDER — LACTOBACILLUS RHAMNOSUS GG 10B CELL
1 CAPSULE ORAL
Status: DISCONTINUED | OUTPATIENT
Start: 2019-04-20 | End: 2019-04-22 | Stop reason: HOSPADM

## 2019-04-20 RX ORDER — AMOXICILLIN AND CLAVULANATE POTASSIUM 875; 125 MG/1; MG/1
1 TABLET, FILM COATED ORAL EVERY 12 HOURS SCHEDULED
Status: DISCONTINUED | OUTPATIENT
Start: 2019-04-20 | End: 2019-04-20

## 2019-04-20 RX ADMIN — POTASSIUM CHLORIDE 10 MEQ: 20 TABLET, EXTENDED RELEASE ORAL at 19:04

## 2019-04-20 RX ADMIN — ASPIRIN 81 MG 81 MG: 81 TABLET ORAL at 09:19

## 2019-04-20 RX ADMIN — METOPROLOL SUCCINATE 25 MG: 25 TABLET, EXTENDED RELEASE ORAL at 21:19

## 2019-04-20 RX ADMIN — CELECOXIB 200 MG: 200 CAPSULE ORAL at 21:22

## 2019-04-20 RX ADMIN — ACETAMINOPHEN 650 MG: 325 TABLET ORAL at 21:25

## 2019-04-20 RX ADMIN — APIXABAN 5 MG: 5 TABLET, FILM COATED ORAL at 21:22

## 2019-04-20 RX ADMIN — CELECOXIB 200 MG: 200 CAPSULE ORAL at 09:19

## 2019-04-20 RX ADMIN — AMIODARONE HYDROCHLORIDE 200 MG: 200 TABLET ORAL at 09:18

## 2019-04-20 RX ADMIN — CEFEPIME HYDROCHLORIDE 2 G: 2 INJECTION, POWDER, FOR SOLUTION INTRAVENOUS at 12:22

## 2019-04-20 RX ADMIN — FUROSEMIDE 20 MG: 20 TABLET ORAL at 09:19

## 2019-04-20 RX ADMIN — LEVOTHYROXINE SODIUM 100 MCG: 100 TABLET ORAL at 05:56

## 2019-04-20 RX ADMIN — PANTOPRAZOLE SODIUM 40 MG: 40 TABLET, DELAYED RELEASE ORAL at 05:57

## 2019-04-20 RX ADMIN — APIXABAN 5 MG: 5 TABLET, FILM COATED ORAL at 16:43

## 2019-04-20 RX ADMIN — ENOXAPARIN SODIUM 80 MG: 80 INJECTION SUBCUTANEOUS at 09:18

## 2019-04-20 RX ADMIN — SODIUM CHLORIDE, PRESERVATIVE FREE 10 ML: 5 INJECTION INTRAVENOUS at 09:20

## 2019-04-20 RX ADMIN — METOPROLOL SUCCINATE 25 MG: 25 TABLET, EXTENDED RELEASE ORAL at 09:18

## 2019-04-20 RX ADMIN — Medication 1 CAPSULE: at 09:19

## 2019-04-20 RX ADMIN — AMIODARONE HYDROCHLORIDE 200 MG: 200 TABLET ORAL at 21:22

## 2019-04-20 RX ADMIN — POTASSIUM CHLORIDE 10 MEQ: 20 TABLET, EXTENDED RELEASE ORAL at 09:19

## 2019-04-20 RX ADMIN — CEFEPIME HYDROCHLORIDE 2 G: 2 INJECTION, POWDER, FOR SOLUTION INTRAVENOUS at 05:17

## 2019-04-20 RX ADMIN — DILTIAZEM HYDROCHLORIDE 360 MG: 180 CAPSULE, EXTENDED RELEASE ORAL at 09:24

## 2019-04-20 RX ADMIN — CEFEPIME HYDROCHLORIDE 2 G: 2 INJECTION, POWDER, FOR SOLUTION INTRAVENOUS at 21:22

## 2019-04-20 RX ADMIN — PRAVASTATIN SODIUM 20 MG: 20 TABLET ORAL at 21:22

## 2019-04-20 RX ADMIN — GABAPENTIN 100 MG: 100 CAPSULE ORAL at 21:22

## 2019-04-20 ASSESSMENT — PAIN SCALES - GENERAL
PAINLEVEL_OUTOF10: 0

## 2019-04-20 NOTE — PROGRESS NOTES
Today's plan:  contineu present care, s/p cardioversion, patient has diarrhea r/o C difficle      Admit Date:  4/16/2019    Subjective:better      Chief complaints on admission  Chief Complaint   Patient presents with    Shortness of Breath     increasing shortness of breath, worse with any exertion; seen by Dr. Erika Gonzales yesterday; oxgen sat in triage is 88-89% on room air     Fatigue     increasing fatigue     Emesis     pt reports one episode of emesis this am; denies abdominal pain    Tachycardia     pt told he was in a.fib in the office, pt history of a.fib and cardioversion with Dr. Miroslava Land; pt is on eliquis         History of present illness:Toni is a 75 y. o.year old who  presents with had concerns including Shortness of Breath (increasing shortness of breath, worse with any exertion; seen by Dr. Erika Gonzales yesterday; oxgen sat in triage is 88-89% on room air ); Fatigue (increasing fatigue ); Emesis (pt reports one episode of emesis this am; denies abdominal pain); and Tachycardia (pt told he was in a.fib in the office, pt history of a.fib and cardioversion with Dr. Miroslava Land; pt is on eliquis). 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 Past surgical history:   has a past surgical history that includes Brain aneurysm surgery; knee surgery (1977); knee surgery; hernia repair; vitrectomy (05-); Cataract removal (2012); Atrial ablation surgery (N/A, 08/16/2016); and ablation of dysrhythmic focus. Social History:   reports that he has never smoked.  He has never used smokeless tobacco. He reports that he does not drink alcohol or use drugs. Family history:  family history includes High Blood Pressure in his mother. Allergies   Allergen Reactions    Codeine Nausea And Vomiting    Vicodin [Hydrocodone-Acetaminophen] Nausea And Vomiting         Objective:   /77   Pulse 92   Temp 98.8 °F (37.1 °C) (Oral)   Resp (!) 34   Ht 5' 6\" (1.676 m)   Wt 170 lb 3.1 oz (77.2 kg)   SpO2 91%   BMI 27.47 kg/m²       Intake/Output Summary (Last 24 hours) at 4/20/2019 1955  Last data filed at 4/20/2019 1800  Gross per 24 hour   Intake 410 ml   Output 975 ml   Net -565 ml       TELEMETRY: Sinus     Physical Exam:  Constitutional:  Well developed, Well nourished, No acute distress, Non-toxic appearance. HENT:  Normocephalic, Atraumatic, Bilateral external ears normal, Oropharynx moist, No oral exudates, Nose normal. Neck- Normal range of motion, No tenderness, Supple, No stridor. Eyes:  PERRL, EOMI, Conjunctiva normal, No discharge. Respiratory:  Normal breath sounds, No respiratory distress, No wheezing, No chest tenderness. ,no use of accessory muscles, diaphragm movement is normal  Cardiovascular: (PMI) apex non displaced,no lifts no thrills, no s3,no s4, Normal heart rate, Normal rhythm, No murmurs, No rubs, No gallops. Carotid arteries pulse and amplitude are normal no bruit, no abdominal bruit noted ( normal abdominal aorta ausculation), femoral arteries pulse and amplitude are normal no bruit, pedal pulses are normal  GI:  Bowel sounds normal, Soft, No tenderness, No masses, No pulsatile masses. : External genitalia appear normal, No masses or lesions. No discharge. No CVA tenderness. Musculoskeletal:  Intact distal pulses, No edema, No tenderness, No cyanosis, No clubbing. Good range of motion in all major joints. No tenderness to palpation or major deformities noted. Back- No tenderness. Integument:  Warm, Dry, No erythema, No rash. Lymphatic:  No lymphadenopathy noted. Neurologic:  Alert & oriented x 3, Normal motor function, Normal sensory function, No focal deficits noted. Psychiatric:  Affect normal, Judgment normal, Mood normal.     Medications:    lactobacillus  1 capsule Oral Daily with breakfast    furosemide  20 mg Oral Daily    potassium chloride  10 mEq Oral BID WC    cefepime  2 g Intravenous Q12H    apixaban  5 mg Oral BID    amiodarone  200 mg Oral BID    metoprolol succinate  25 mg Oral BID    celecoxib  200 mg Oral BID    sodium chloride flush  10 mL Intravenous 2 times per day    aspirin  81 mg Oral Daily    diltiazem  360 mg Oral Daily    gabapentin  100 mg Oral Nightly    levothyroxine  100 mcg Oral QAM AC    pravastatin  20 mg Oral Nightly    pantoprazole  40 mg Oral QAM AC      amiodarone 0.5 mg/min (04/18/19 2321)     sodium chloride flush, magnesium hydroxide, ondansetron, acetaminophen, nitroGLYCERIN, magnesium sulfate    Lab Data:  CBC:   Recent Labs     04/19/19  0347   WBC 13.4*   HGB 12.3*   HCT 39.9*   .8*        BMP:   Recent Labs     04/18/19  0401 04/19/19  0347 04/20/19  0544    140 142   K 3.8 3.8 3.6    102 103   CO2 22 22 26   PHOS  --  4.1  --    BUN 27* 39* 37*   CREATININE 0.9 1.1 0.9     LIVER PROFILE: No results for input(s): AST, ALT, LIPASE, BILIDIR, BILITOT, ALKPHOS in the last 72 hours. Invalid input(s): AMYLASE,  ALB  PT/INR: No results for input(s): PROTIME, INR in the last 72 hours. APTT: No results for input(s): APTT in the last 72 hours. BNP:  No results for input(s): BNP in the last 72 hours. TROPONIN: @TROPONINI:3@      Assessment:  75 y. o.year old who is admitted for          Plan:  1. Atrial tachycardia: s/p cardioversion, continue lovenox and change amiodarone to oral.  2. HTN\": stable, cotnienu lopressor and cardizem  3. Dyslipidemia: stable. Continue statins  4.  Possible pneumonia: contineu antibioitcs      Health maintenance: exerise and diet  All labs, medications and tests reviewed, continue all other medications of all above medical condition listed as is.       Ashli Wood MD 4/20/2019 7:55 PM

## 2019-04-20 NOTE — PLAN OF CARE
Problem: Falls - Risk of:  Goal: Will remain free from falls  Description  Will remain free from falls  Outcome: Ongoing  Goal: Absence of physical injury  Description  Absence of physical injury  Outcome: Ongoing     Problem: Breathing Pattern - Ineffective:  Goal: Ability to achieve and maintain a regular respiratory rate will improve  Description  Ability to achieve and maintain a regular respiratory rate will improve  Outcome: Ongoing     Problem: Pain:  Goal: Pain level will decrease  Description  Pain level will decrease  Outcome: Ongoing  Goal: Control of acute pain  Description  Control of acute pain  Outcome: Ongoing  Goal: Control of chronic pain  Description  Control of chronic pain  Outcome: Ongoing

## 2019-04-20 NOTE — PLAN OF CARE
Problem: Falls - Risk of:  Goal: Will remain free from falls  Description  Will remain free from falls  4/20/2019 1124 by Shayy Whitt RN  Outcome: Ongoing  4/20/2019 0237 by Satish Palomino RN  Outcome: Ongoing  Goal: Absence of physical injury  Description  Absence of physical injury  4/20/2019 1124 by Shayy Whitt RN  Outcome: Ongoing  4/20/2019 0237 by Satish Palomino RN  Outcome: Ongoing     Problem: Breathing Pattern - Ineffective:  Goal: Ability to achieve and maintain a regular respiratory rate will improve  Description  Ability to achieve and maintain a regular respiratory rate will improve  4/20/2019 1124 by Shayy Whitt RN  Outcome: Ongoing  4/20/2019 0237 by Satish Palomino RN  Outcome: Ongoing     Problem: Pain:  Goal: Pain level will decrease  Description  Pain level will decrease  4/20/2019 1124 by Shayy Whitt RN  Outcome: Ongoing  4/20/2019 0237 by Satish Palomino RN  Outcome: Ongoing  Goal: Control of acute pain  Description  Control of acute pain  4/20/2019 1124 by Shayy Whitt RN  Outcome: Ongoing  4/20/2019 0237 by Satish Palomino RN  Outcome: Ongoing  Goal: Control of chronic pain  Description  Control of chronic pain  4/20/2019 1124 by Shayy Whitt RN  Outcome: Ongoing  4/20/2019 0237 by Satish Palomino RN  Outcome: Ongoing

## 2019-04-20 NOTE — PROGRESS NOTES
[address] [place] [address]  HOSPITALIST PROGRESS NOTE                       Name:  Campbell Dumont /Age/Sex: 1945  (75 y.o. male)   MRN & CSN:  [id number] & [id number] Admission Date/Time: 2019  1:50 PM   Location:  [facility]- Attending:  Asmita Low MD                                                  FRAN Dumont is a 75 y.o. male who presents shortness of breath/atrial fib with RVR    SUBJECTIVE  Feels and looks better but had fever overnight, also reported episode of loose bowel movement    10 point review of systems reviewed and negative unless noted above. ALLERGIES:   Allergies   Allergen Reactions    Codeine Nausea And Vomiting    Vicodin [Hydrocodone-Acetaminophen] Nausea And Vomiting       PCP: Evelina Hinson MD    PAST MEDICAL HISTORY, SURGICAL HISTORY, SOCIAL HISTORY and  HOME MEDICATIONS all reviewed. OBJECTIVE  Vitals:    19 2203 19 0226 19 0523 19 0728   BP: (!) 143/83 (!) 142/67 123/77 121/86   Pulse: 108 93 91 98   Resp:    Temp: 101.5 °F (38.6 °C) 97.5 °F (36.4 °C) 98.6 °F (37 °C) 98.6 °F (37 °C)   TempSrc: Rectal Oral Oral Oral   SpO2: (!) 89% 94% 95% 90%   Weight:  170 lb 3.1 oz (77.2 kg)     Height:  5' 6\" (1.676 m)         PHYSICAL EXAM   GEN Awake male, sitting upright in bed in no apparent distress. Appears given age. EYES Pupils are equally round. No scleral erythema, discharge, or conjunctivitis. HENT Mucous membranes are moist. Oral pharynx without exudates, no evidence of thrush. NECK Supple, no apparent thyromegaly or masses. RESP Unlabored respiration, bilateral rhonchi  CARDIO/VASC S1/S2 auscultated. Regular rate without appreciable murmurs, rubs, or gallops. No JVD or carotid bruits. Peripheral pulses equal bilaterally and palpable. GI Abdomen is soft without significant tenderness, masses, or guarding. Bowel sounds are normoactive. Rectal exam deferred.  No costovertebral angle tenderness.  Normal appearing external genitalia. Solorzano catheter is not present. HEME/LYMPH No palpable cervical lymphadenopathy and no hepatosplenomegaly. No petechiae or ecchymoses. MSK Spontaneous movement of all extremities. No gross joint deformities. SKIN Normal coloration, warm, dry. NEURO Cranial nerves appear grossly intact, normal speech, no lateralizing weakness. PSYCH Awake, alert, oriented x 4. Affect appropriate. INTAKE: In: 120 [P.O.:120]  Out: 700   OUTPUT: In: 120   Out: 700 [Urine:700]    LABS  Recent Labs     04/19/19  0347   WBC 13.4*   HGB 12.3*   HCT 39.9*         Recent Labs     04/18/19  0401 04/19/19  0347 04/20/19  0544    140 142   K 3.8 3.8 3.6    102 103   CO2 22 22 26   PHOS  --  4.1  --    BUN 27* 39* 37*   CREATININE 0.9 1.1 0.9     No results for input(s): AST, ALT, ALB, BILIDIR, BILITOT, ALKPHOS in the last 72 hours. No results for input(s): INR in the last 72 hours. No results for input(s): CKTOTAL, CKMB, CKMBINDEX, TROPONINT in the last 72 hours. Abnormal labs for today noted      Imaging: CT-  Impression   1. No evidence of pulmonary embolic disease. 2. Multifocal ground-glass opacification throughout the lungs.  The findings   are nonspecific with an infectious or inflammatory pneumonitis favored versus   edema and heart failure.  No pleural fluid. 3. Cardiomegaly. 4. Mildly enlarged main pulmonary artery indicative of pulmonary hypertension.    5. Ascending aortic ectasia at 4.4 cm maximally.             ECHO: Summary   Left ventricular systolic function is normal.   Ejection fraction is visually estimated at 55-60%.   Unable to assess diastolic dysfunction due to atrial fibrillation.   Moderate aortic insufficiency with PHT of 303 msec.   Mild aortic stenosis with a mean gradient of 9 mmHg.   Moderate tricuspid regurgitation with RVSP of 45 mmHg.   No evidence of any pericardial effusion.   Aortic root is dilated     Microbiology:  Blood culture:    Urine culture:    Sputum culture:    Procedures done this admission:    MEDS  Scheduled Meds:   lactobacillus  1 capsule Oral Daily with breakfast    furosemide  20 mg Oral Daily    potassium chloride  10 mEq Oral BID WC    cefepime  2 g Intravenous Q12H    amiodarone  200 mg Oral BID    metoprolol succinate  25 mg Oral BID    celecoxib  200 mg Oral BID    enoxaparin  1 mg/kg Subcutaneous BID    sodium chloride flush  10 mL Intravenous 2 times per day    aspirin  81 mg Oral Daily    diltiazem  360 mg Oral Daily    gabapentin  100 mg Oral Nightly    levothyroxine  100 mcg Oral QAM AC    pravastatin  20 mg Oral Nightly    pantoprazole  40 mg Oral QAM AC     Continuous Infusions:   amiodarone 0.5 mg/min (04/18/19 7618)     PRN Meds:sodium chloride flush, magnesium hydroxide, ondansetron, acetaminophen, nitroGLYCERIN, magnesium sulfate        ASSESSMENT and [facility] Day: 5    1-Shortness of breath- noted CT concerning for bilateral infiltrates- concerning for bilateral pneumonia vs acute diastolic HF- treat for both at this time- wean oxygen off after HR is controlled  2-atrial fib with RVR- likely precipitated by above- s/p cardioversion on 4/18- on amiodarone, on lovenox therapeutic  3-SIRS- with leukocytosis and tachycardia- rule out sepsis- blood culture negative. Had fever overnight- monitor for now    Other chronic conditions  -HTN  -Hypothyroidism  -OA      -holding discharge due to fever overnight, has to be afebrile >24 hours before discharge. Addendum: evaluated for home oxygen which he qualifed as oxygen saturation dropped to 81% on room air, needs 2 liters per NC oxygen all the time due to underlying congestive heart failure         Disp: home    Diet DIET CARDIAC;   Dietary Nutrition Supplements: Low Calorie High Protein Supplement   DVT Prophylaxis [x] Lovenox, []  Heparin, [] SCDs, [] Ambulation   GI Prophylaxis [] PPI,  [] H2 Blocker,  [] Carafate,  [] Diet/Tube Feeds   Code Status Full Code   Disposition Patient requires continued admission due to atrial fib with RVR   CMS Level of Risk [] Low, [] Moderate,[x]  High  Patient's risk as above due to atrial fib with RVR     AYUSH RDZ MD 4/20/2019 1:12 PM

## 2019-04-21 LAB
ANION GAP SERPL CALCULATED.3IONS-SCNC: 14 MMOL/L (ref 4–16)
BUN BLDV-MCNC: 31 MG/DL (ref 6–23)
CALCIUM SERPL-MCNC: 8.7 MG/DL (ref 8.3–10.6)
CHLORIDE BLD-SCNC: 105 MMOL/L (ref 99–110)
CLOSTRIDIUM DIFFICILE, PCR: NORMAL
CLOSTRIDIUM DIFFICILE, PCR: NORMAL
CO2: 23 MMOL/L (ref 21–32)
CREAT SERPL-MCNC: 0.9 MG/DL (ref 0.9–1.3)
CULTURE: NORMAL
CULTURE: NORMAL
GFR AFRICAN AMERICAN: >60 ML/MIN/1.73M2
GFR NON-AFRICAN AMERICAN: >60 ML/MIN/1.73M2
GLUCOSE BLD-MCNC: 103 MG/DL (ref 70–99)
HCT VFR BLD CALC: 37.5 % (ref 42–52)
HEMOGLOBIN: 11.5 GM/DL (ref 13.5–18)
Lab: NORMAL
Lab: NORMAL
MAGNESIUM: 1.7 MG/DL (ref 1.8–2.4)
MCH RBC QN AUTO: 31.3 PG (ref 27–31)
MCHC RBC AUTO-ENTMCNC: 30.7 % (ref 32–36)
MCV RBC AUTO: 102.2 FL (ref 78–100)
PDW BLD-RTO: 12.4 % (ref 11.7–14.9)
PLATELET # BLD: 391 K/CU MM (ref 140–440)
PMV BLD AUTO: 9.8 FL (ref 7.5–11.1)
POTASSIUM SERPL-SCNC: 4 MMOL/L (ref 3.5–5.1)
RBC # BLD: 3.67 M/CU MM (ref 4.6–6.2)
SODIUM BLD-SCNC: 142 MMOL/L (ref 135–145)
SPECIMEN: NORMAL
SPECIMEN: NORMAL
WBC # BLD: 12 K/CU MM (ref 4–10.5)

## 2019-04-21 PROCEDURE — 6370000000 HC RX 637 (ALT 250 FOR IP): Performed by: NURSE PRACTITIONER

## 2019-04-21 PROCEDURE — 87040 BLOOD CULTURE FOR BACTERIA: CPT

## 2019-04-21 PROCEDURE — 36415 COLL VENOUS BLD VENIPUNCTURE: CPT

## 2019-04-21 PROCEDURE — 80048 BASIC METABOLIC PNL TOTAL CA: CPT

## 2019-04-21 PROCEDURE — 2580000003 HC RX 258: Performed by: NURSE PRACTITIONER

## 2019-04-21 PROCEDURE — 83735 ASSAY OF MAGNESIUM: CPT

## 2019-04-21 PROCEDURE — 6370000000 HC RX 637 (ALT 250 FOR IP): Performed by: HOSPITALIST

## 2019-04-21 PROCEDURE — 2140000000 HC CCU INTERMEDIATE R&B

## 2019-04-21 PROCEDURE — 85027 COMPLETE CBC AUTOMATED: CPT

## 2019-04-21 RX ORDER — AMOXICILLIN AND CLAVULANATE POTASSIUM 875; 125 MG/1; MG/1
1 TABLET, FILM COATED ORAL EVERY 12 HOURS SCHEDULED
Status: DISCONTINUED | OUTPATIENT
Start: 2019-04-21 | End: 2019-04-22 | Stop reason: HOSPADM

## 2019-04-21 RX ADMIN — PANTOPRAZOLE SODIUM 40 MG: 40 TABLET, DELAYED RELEASE ORAL at 06:33

## 2019-04-21 RX ADMIN — LEVOTHYROXINE SODIUM 100 MCG: 100 TABLET ORAL at 06:34

## 2019-04-21 RX ADMIN — PRAVASTATIN SODIUM 20 MG: 20 TABLET ORAL at 20:17

## 2019-04-21 RX ADMIN — CELECOXIB 200 MG: 200 CAPSULE ORAL at 08:48

## 2019-04-21 RX ADMIN — AMIODARONE HYDROCHLORIDE 200 MG: 200 TABLET ORAL at 20:15

## 2019-04-21 RX ADMIN — METOPROLOL SUCCINATE 25 MG: 25 TABLET, EXTENDED RELEASE ORAL at 08:48

## 2019-04-21 RX ADMIN — APIXABAN 5 MG: 5 TABLET, FILM COATED ORAL at 08:48

## 2019-04-21 RX ADMIN — AMIODARONE HYDROCHLORIDE 200 MG: 200 TABLET ORAL at 08:48

## 2019-04-21 RX ADMIN — AMOXICILLIN AND CLAVULANATE POTASSIUM 1 TABLET: 875; 125 TABLET, FILM COATED ORAL at 20:18

## 2019-04-21 RX ADMIN — DILTIAZEM HYDROCHLORIDE 360 MG: 180 CAPSULE, EXTENDED RELEASE ORAL at 08:48

## 2019-04-21 RX ADMIN — Medication 1 CAPSULE: at 08:48

## 2019-04-21 RX ADMIN — ACETAMINOPHEN 650 MG: 325 TABLET ORAL at 20:17

## 2019-04-21 RX ADMIN — GABAPENTIN 100 MG: 100 CAPSULE ORAL at 20:15

## 2019-04-21 RX ADMIN — APIXABAN 5 MG: 5 TABLET, FILM COATED ORAL at 20:16

## 2019-04-21 RX ADMIN — AMOXICILLIN AND CLAVULANATE POTASSIUM 1 TABLET: 875; 125 TABLET, FILM COATED ORAL at 12:46

## 2019-04-21 RX ADMIN — POTASSIUM CHLORIDE 10 MEQ: 20 TABLET, EXTENDED RELEASE ORAL at 17:55

## 2019-04-21 RX ADMIN — CELECOXIB 200 MG: 200 CAPSULE ORAL at 20:15

## 2019-04-21 RX ADMIN — FUROSEMIDE 20 MG: 20 TABLET ORAL at 08:48

## 2019-04-21 RX ADMIN — POTASSIUM CHLORIDE 10 MEQ: 20 TABLET, EXTENDED RELEASE ORAL at 08:49

## 2019-04-21 RX ADMIN — SODIUM CHLORIDE, PRESERVATIVE FREE 10 ML: 5 INJECTION INTRAVENOUS at 08:48

## 2019-04-21 RX ADMIN — ASPIRIN 81 MG 81 MG: 81 TABLET ORAL at 08:48

## 2019-04-21 RX ADMIN — SODIUM CHLORIDE, PRESERVATIVE FREE 10 ML: 5 INJECTION INTRAVENOUS at 20:28

## 2019-04-21 RX ADMIN — METOPROLOL SUCCINATE 25 MG: 25 TABLET, EXTENDED RELEASE ORAL at 20:17

## 2019-04-21 ASSESSMENT — PAIN SCALES - GENERAL
PAINLEVEL_OUTOF10: 0

## 2019-04-21 NOTE — PROGRESS NOTES
[address] [place] [address]  HOSPITALIST PROGRESS NOTE                       Name:  Lady Bradley /Age/Sex: 1945  (75 y.o. male)   MRN & CSN:  [id number] & [id number] Admission Date/Time: 2019  1:50 PM   Location:  [address] [place] [address] Attending:  Rasheed Calderon MD                                                  HPI  Lady Bradley is a 75 y.o. male who presents shortness of breath/atrial fib with RVR    SUBJECTIVE  -feels better but again reports another fever last night, two episodes of loose stools overnight. 10 point review of systems reviewed and negative unless noted above. ALLERGIES:   Allergies   Allergen Reactions    Codeine Nausea And Vomiting    Vicodin [Hydrocodone-Acetaminophen] Nausea And Vomiting       PCP: Jose Francisco Reza MD    PAST MEDICAL HISTORY, SURGICAL HISTORY, SOCIAL HISTORY and  HOME MEDICATIONS all reviewed. OBJECTIVE  Vitals:    19 2119 19 2130 19 0221 19 0842   BP: 135/80 135/80 (!) 111/58 117/73   Pulse: 110 104 81 86   Resp:  (!) 43 30 25   Temp:  101.1 °F (38.4 °C) 98.3 °F (36.8 °C) 98.4 °F (36.9 °C)   TempSrc:  Rectal Rectal    SpO2:   95% 95%   Weight:       Height:           PHYSICAL EXAM   GEN Awake male, sitting upright in bed in no apparent distress. Appears given age. EYES Pupils are equally round. No scleral erythema, discharge, or conjunctivitis. HENT Mucous membranes are moist. Oral pharynx without exudates, no evidence of thrush. NECK Supple, no apparent thyromegaly or masses. RESP Unlabored respiration, bilateral rhonchi  CARDIO/VASC S1/S2 auscultated. Regular rate without appreciable murmurs, rubs, or gallops. No JVD or carotid bruits. Peripheral pulses equal bilaterally and palpable. GI Abdomen is soft without significant tenderness, masses, or guarding. Bowel sounds are normoactive. Rectal exam deferred.  No costovertebral angle tenderness. Normal appearing external genitalia.  Solorzano catheter is not present. HEME/LYMPH No palpable cervical lymphadenopathy and no hepatosplenomegaly. No petechiae or ecchymoses. MSK Spontaneous movement of all extremities. No gross joint deformities. SKIN Normal coloration, warm, dry. NEURO Cranial nerves appear grossly intact, normal speech, no lateralizing weakness. PSYCH Awake, alert, oriented x 4. Affect appropriate. INTAKE: In: 290 [P.O.:240]  Out: 975   OUTPUT: In: 290   Out: 975 [Urine:975]    LABS  Recent Labs     04/19/19 0347 04/21/19  0340   WBC 13.4* 12.0*   HGB 12.3* 11.5*   HCT 39.9* 37.5*    391      Recent Labs     04/19/19  0347 04/20/19  0544 04/21/19  0340    142 142   K 3.8 3.6 4.0    103 105   CO2 22 26 23   PHOS 4.1  --   --    BUN 39* 37* 31*   CREATININE 1.1 0.9 0.9     No results for input(s): AST, ALT, ALB, BILIDIR, BILITOT, ALKPHOS in the last 72 hours. No results for input(s): INR in the last 72 hours. No results for input(s): CKTOTAL, CKMB, CKMBINDEX, TROPONINT in the last 72 hours. Abnormal labs for today noted      Imaging: CT-  Impression   1. No evidence of pulmonary embolic disease. 2. Multifocal ground-glass opacification throughout the lungs.  The findings   are nonspecific with an infectious or inflammatory pneumonitis favored versus   edema and heart failure.  No pleural fluid. 3. Cardiomegaly. 4. Mildly enlarged main pulmonary artery indicative of pulmonary hypertension.    5. Ascending aortic ectasia at 4.4 cm maximally.             ECHO: Summary   Left ventricular systolic function is normal.   Ejection fraction is visually estimated at 55-60%.   Unable to assess diastolic dysfunction due to atrial fibrillation.   Moderate aortic insufficiency with PHT of 303 msec.   Mild aortic stenosis with a mean gradient of 9 mmHg.   Moderate tricuspid regurgitation with RVSP of 45 mmHg.   No evidence of any pericardial effusion.   Aortic root is dilated     Microbiology:  Blood culture:    Urine culture:    Sputum culture:    Procedures done this admission:    MEDS  Scheduled Meds:   amoxicillin-clavulanate  1 tablet Oral 2 times per day    lactobacillus  1 capsule Oral Daily with breakfast    furosemide  20 mg Oral Daily    potassium chloride  10 mEq Oral BID WC    apixaban  5 mg Oral BID    amiodarone  200 mg Oral BID    metoprolol succinate  25 mg Oral BID    celecoxib  200 mg Oral BID    sodium chloride flush  10 mL Intravenous 2 times per day    aspirin  81 mg Oral Daily    diltiazem  360 mg Oral Daily    gabapentin  100 mg Oral Nightly    levothyroxine  100 mcg Oral QAM AC    pravastatin  20 mg Oral Nightly    pantoprazole  40 mg Oral QAM AC     Continuous Infusions:   amiodarone 0.5 mg/min (04/18/19 4882)     PRN Meds:sodium chloride flush, magnesium hydroxide, ondansetron, acetaminophen, nitroGLYCERIN, magnesium sulfate        ASSESSMENT and [facility] Day: 6    1-Shortness of breath- noted CT concerning for bilateral infiltrates- concerning for bilateral pneumonia vs acute diastolic HF- treat for both at this time- wean oxygen off after HR is controlled  2-atrial fib with RVR- likely precipitated by above- s/p cardioversion on 4/18- on amiodarone, on lovenox therapeutic  3-SIRS- with leukocytosis and tachycardia- rule out sepsis- blood culture negative. Had fever last two nights-suspect could be abx related- will stop cefepime and start augmentin as he looks good clinically    Other chronic conditions  -HTN  -Hypothyroidism  -OA      -holding discharge due to fever overnight which may be related to cefepime- stopped and monitor overnight    Addendum: evaluated for home oxygen which he qualifed as oxygen saturation dropped to 81% on room air, needs 2 liters per NC oxygen all the time due to underlying congestive heart failure         Disp: home    Diet DIET CARDIAC;   Dietary Nutrition Supplements: Low Calorie High Protein Supplement   DVT Prophylaxis [x] Lovenox, []  Heparin, [] SCDs, [] Ambulation   GI Prophylaxis [] PPI,  [] H2 Blocker,  [] Carafate,  [] Diet/Tube Feeds   Code Status Full Code   Disposition Patient requires continued admission due to atrial fib with RVR   CMS Level of Risk [] Low, [] Moderate,[x]  High  Patient's risk as above due to atrial fib with RVR     AYUSH RDZ MD 4/21/2019 11:47 AM

## 2019-04-21 NOTE — PROGRESS NOTES
Pt qualifies for home oxygen. Paperwork is complete and has been faxed to [employer]. Home Oxygen Evaluation was completed on 4/20/19 and is only good for 48 hours. RN said pt will be going home tomorrow. Please call [employer] before pt is discharged ([phone]).

## 2019-04-22 ENCOUNTER — TELEPHONE (OUTPATIENT)
Dept: CARDIOLOGY CLINIC | Age: 74
End: 2019-04-22

## 2019-04-22 VITALS
TEMPERATURE: 99.1 F | WEIGHT: 173.06 LBS | SYSTOLIC BLOOD PRESSURE: 137 MMHG | DIASTOLIC BLOOD PRESSURE: 72 MMHG | RESPIRATION RATE: 30 BRPM | HEIGHT: 66 IN | OXYGEN SATURATION: 97 % | HEART RATE: 94 BPM | BODY MASS INDEX: 27.81 KG/M2

## 2019-04-22 LAB
MAGNESIUM: 1.9 MG/DL (ref 1.8–2.4)
PRO-BNP: 935 PG/ML

## 2019-04-22 PROCEDURE — 6370000000 HC RX 637 (ALT 250 FOR IP): Performed by: NURSE PRACTITIONER

## 2019-04-22 PROCEDURE — 2700000000 HC OXYGEN THERAPY PER DAY

## 2019-04-22 PROCEDURE — 6370000000 HC RX 637 (ALT 250 FOR IP): Performed by: HOSPITALIST

## 2019-04-22 PROCEDURE — APPSS30 APP SPLIT SHARED TIME 16-30 MINUTES: Performed by: NURSE PRACTITIONER

## 2019-04-22 PROCEDURE — 83735 ASSAY OF MAGNESIUM: CPT

## 2019-04-22 PROCEDURE — 2580000003 HC RX 258: Performed by: NURSE PRACTITIONER

## 2019-04-22 PROCEDURE — 83880 ASSAY OF NATRIURETIC PEPTIDE: CPT

## 2019-04-22 PROCEDURE — 94761 N-INVAS EAR/PLS OXIMETRY MLT: CPT

## 2019-04-22 PROCEDURE — 99233 SBSQ HOSP IP/OBS HIGH 50: CPT | Performed by: INTERNAL MEDICINE

## 2019-04-22 RX ORDER — LACTOBACILLUS RHAMNOSUS GG 10B CELL
1 CAPSULE ORAL
Qty: 30 CAPSULE | Refills: 0 | Status: SHIPPED | OUTPATIENT
Start: 2019-04-23 | End: 2020-07-30

## 2019-04-22 RX ORDER — POTASSIUM CHLORIDE 750 MG/1
10 TABLET, EXTENDED RELEASE ORAL DAILY
Qty: 60 TABLET | Refills: 3 | Status: SHIPPED | OUTPATIENT
Start: 2019-04-22

## 2019-04-22 RX ORDER — AMOXICILLIN AND CLAVULANATE POTASSIUM 875; 125 MG/1; MG/1
1 TABLET, FILM COATED ORAL EVERY 12 HOURS SCHEDULED
Qty: 10 TABLET | Refills: 0 | Status: SHIPPED | OUTPATIENT
Start: 2019-04-22 | End: 2019-04-27

## 2019-04-22 RX ORDER — FUROSEMIDE 20 MG/1
20 TABLET ORAL DAILY
Qty: 60 TABLET | Refills: 3 | Status: SHIPPED | OUTPATIENT
Start: 2019-04-23

## 2019-04-22 RX ORDER — AMIODARONE HYDROCHLORIDE 200 MG/1
200 TABLET ORAL 2 TIMES DAILY
Qty: 30 TABLET | Refills: 3 | Status: SHIPPED | OUTPATIENT
Start: 2019-04-22 | End: 2019-04-24 | Stop reason: SDUPTHER

## 2019-04-22 RX ADMIN — APIXABAN 5 MG: 5 TABLET, FILM COATED ORAL at 10:02

## 2019-04-22 RX ADMIN — FUROSEMIDE 20 MG: 20 TABLET ORAL at 10:02

## 2019-04-22 RX ADMIN — LEVOTHYROXINE SODIUM 100 MCG: 100 TABLET ORAL at 06:16

## 2019-04-22 RX ADMIN — CELECOXIB 200 MG: 200 CAPSULE ORAL at 10:01

## 2019-04-22 RX ADMIN — Medication 1 CAPSULE: at 10:01

## 2019-04-22 RX ADMIN — DILTIAZEM HYDROCHLORIDE 360 MG: 180 CAPSULE, EXTENDED RELEASE ORAL at 10:01

## 2019-04-22 RX ADMIN — METOPROLOL SUCCINATE 25 MG: 25 TABLET, EXTENDED RELEASE ORAL at 10:02

## 2019-04-22 RX ADMIN — AMIODARONE HYDROCHLORIDE 200 MG: 200 TABLET ORAL at 10:02

## 2019-04-22 RX ADMIN — POTASSIUM CHLORIDE 10 MEQ: 20 TABLET, EXTENDED RELEASE ORAL at 10:01

## 2019-04-22 RX ADMIN — ASPIRIN 81 MG 81 MG: 81 TABLET ORAL at 10:01

## 2019-04-22 RX ADMIN — SODIUM CHLORIDE, PRESERVATIVE FREE 10 ML: 5 INJECTION INTRAVENOUS at 10:03

## 2019-04-22 RX ADMIN — AMOXICILLIN AND CLAVULANATE POTASSIUM 1 TABLET: 875; 125 TABLET, FILM COATED ORAL at 10:02

## 2019-04-22 RX ADMIN — PANTOPRAZOLE SODIUM 40 MG: 40 TABLET, DELAYED RELEASE ORAL at 06:16

## 2019-04-22 ASSESSMENT — PAIN SCALES - GENERAL: PAINLEVEL_OUTOF10: 0

## 2019-04-22 NOTE — PROGRESS NOTES
ablation surgery (N/A, 08/16/2016); and ablation of dysrhythmic focus. Social History:   reports that he has never smoked. He has never used smokeless tobacco. He reports that he does not drink alcohol or use drugs. Family history:  family history includes High Blood Pressure in his mother. Allergies   Allergen Reactions    Codeine Nausea And Vomiting    Vicodin [Hydrocodone-Acetaminophen] Nausea And Vomiting       Review of Systems:   All 14 systems were reviewed and are negative  Except for the positive findings  which as documented     /72   Pulse 94   Temp 99.1 °F (37.3 °C) (Rectal)   Resp 30   Ht 5' 6\" (1.676 m)   Wt 173 lb 1 oz (78.5 kg)   SpO2 97%   BMI 27.93 kg/m²       Intake/Output Summary (Last 24 hours) at 4/22/2019 1254  Last data filed at 4/22/2019 0432  Gross per 24 hour   Intake 300 ml   Output 750 ml   Net -450 ml       Physical Exam:  Constitutional:  Well developed, Well nourished, No acute distress  HENT:  Normocephalic, Atraumatic, Bilateral external ears normal, Oropharynx moist, , Nose normal.   Neck- ,No tenderness, Supple, No stridor. Eyes:  PERRL, Conjunctiva normal, No discharge. Respiratory:  decreasedl breath sounds, No respiratory distress, No wheezing, No chest tenderness. Cardiovascular:  Normal heart rate, Normal rhythm, SR murmurs appreciated, No rubs appreciated, No gallops appreciated, JVP not elevated  Abdomen/GI:  Bowel sounds normal, Soft,  Musculoskeletal:  Intact distal pulses, no edema, No tenderness  Integument:  Warm, Dry, No erythema, No rash. Lymphatic:  No lymphadenopathy noted.    Neurologic:  Alert & oriented   Psychiatric:  Affect and Mood :pleasant     Medications:    amoxicillin-clavulanate  1 tablet Oral 2 times per day    lactobacillus  1 capsule Oral Daily with breakfast    furosemide  20 mg Oral Daily    potassium chloride  10 mEq Oral BID WC    apixaban  5 mg Oral BID    amiodarone  200 mg Oral BID    metoprolol succinate  25 mg Oral BID    celecoxib  200 mg Oral BID    sodium chloride flush  10 mL Intravenous 2 times per day    aspirin  81 mg Oral Daily    diltiazem  360 mg Oral Daily    gabapentin  100 mg Oral Nightly    levothyroxine  100 mcg Oral QAM AC    pravastatin  20 mg Oral Nightly    pantoprazole  40 mg Oral QAM AC      amiodarone 0.5 mg/min (04/18/19 2651)     sodium chloride flush, magnesium hydroxide, ondansetron, acetaminophen, nitroGLYCERIN, magnesium sulfate    Lab Data:  CBC:   Recent Labs     04/21/19  0340   WBC 12.0*   HGB 11.5*   HCT 37.5*   .2*        BMP:   Recent Labs     04/20/19  0544 04/21/19  0340    142   K 3.6 4.0    105   CO2 26 23   BUN 37* 31*   CREATININE 0.9 0.9     PT/INR: No results for input(s): PROTIME, INR in the last 72 hours. BNP:    Recent Labs     04/20/19  0544 04/22/19  0428   PROBNP 564.1* 935.0*     TROPONIN: No results for input(s): TROPONINT in the last 72 hours. ECHO :       NM Myocardial Spect      Impression:  Active Problems:    Atrial fibrillation with tachycardic ventricular rate (HCC)    Atrial tachycardia (HCC)  Resolved Problems:    * No resolved hospital problems. *       All labs, medications and tests reviewed by myself, continue all other medications of all above medical condition listed as is except for changes mentioned above. Thank you   Please call with questions. Electronically signed by NAVNEET MCGRATH CNP on 4/22/2019 at 12:54 PM   I have seen ,spoken to  and examined this patient personally, independently of the nurse practitioner. I have reviewed the hospital care given to date and reviewed all pertinent labs and imaging. The plan was developed mutually at the time of the visit with the patient,  Oneyda Obrien  and myself. I have spoken with patient, nursing staff and provided written and verbal instructions . The above note has been reviewed and I agree with the assessment, diagnosis, and treatment plan with changes made by me as follows     CARDIOLOGY ATTENDING ADDENDUM    HPI:  I have reviewed the above HPI  And agree with above   Radha Miranda is a 75 y. o.year old who and presents with had concerns including Shortness of Breath (increasing shortness of breath, worse with any exertion; seen by Dr. Regulo Thompson yesterday; oxgen sat in triage is 88-89% on room air ); Fatigue (increasing fatigue ); Emesis (pt reports one episode of emesis this am; denies abdominal pain); and Tachycardia (pt told he was in a.fib in the office, pt history of a.fib and cardioversion with Dr. Ahmet Coates; pt is on eliquis).   Chief Complaint   Patient presents with    Shortness of Breath     increasing shortness of breath, worse with any exertion; seen by Dr. Regulo Thompson yesterday; oxgen sat in triage is 88-89% on room air     Fatigue     increasing fatigue     Emesis     pt reports one episode of emesis this am; denies abdominal pain    Tachycardia     pt told he was in a.fib in the office, pt history of a.fib and cardioversion with Dr. Ahmet Coates; pt is on eliquis     Interval history:  Sinus tach with exertion     Physical Exam:  General:  Awake, alert, NAD  Head:normal  Eye:normal  Neck:  No JVD   Chest:  Clear to auscultation, respiration easy  Cardiovascular:  RRR S1S2  Abdomen:   nontender  Extremities:  No  edema  Pulses; palpable  Neuro: grossly normal      MEDICAL DECISION MAKING;    I agree with the above plan, which was planned by myself and discussed with NP.  AI : follow up as outpatient  Afib: in sinus now, continue amiodarone for 1 week bid and then daily  Follow up in office       Yoav Galvan MD [facility] [place]

## 2019-04-22 NOTE — DISCHARGE SUMMARY
Discharge Summary    Name:  Joyce Smith /Age/Sex: 1945  (75 y.o. male)   MRN & CSN:  [id number] & [id number] Admission Date/Time: 2019  1:50 PM   Attending:  Roni Crabtree MD Discharging Physician: Guy Rubi MD     HPI and Hospital Course:   Joyce Smith is a 75 y.o.  male  who presents with Shortness of Breath (increasing shortness of breath, worse with any exertion; seen by Dr. Scott Osorio yesterday; oxgen sat in triage is 88-89% on room air ); Fatigue (increasing fatigue ); Emesis (pt reports one episode of emesis this am; denies abdominal pain); and Tachycardia (pt told he was in a.fib in the office, pt history of a.fib and cardioversion with Dr. Lars Sanchez; pt is on eliquis)    HPI- as per H and [address]  1-Shortness of breath- noted CT concerning for bilateral infiltrates- concerning for bilateral pneumonia vs acute diastolic HF- treat for both at this time-clinically improved but qualifying for home oxygen which he is going to be discharged on. Does have underlying valvular HD too which could be contributing to his symptoms- discussed to follow up with cardio on this. 2-atrial fib with RVR- likely precipitated by above- s/p cardioversion on - on amiodarone, resume eliquis. 3-SIRS- with leukocytosis and tachycardia on presentation- sepsis ruled out. Has been having intermittent fevers mainly at night which is rectal- fevers coincided with initiation of cefepime, as he clinically looked good, stopped cefepime and placed on augmentin on which he is doing fine so far with no ongoing fevers.   4-Debility- patient noted with shortness of breath on exertion and tachycardia at times- recovers after while, discussed options- including rehab- he declines rehab and requesting home health services which is ordered.      Other chronic conditions  -HTN  -Hypothyroidism  -OA          Addendum: evaluated for home oxygen which he qualifed as oxygen saturation dropped to 81% on room air, needs 2 liters per NC oxygen all the time due to underlying congestive heart failure              The patient expressed appropriate understanding of and agreement with the discharge recommendations, medications, and plan. Consults this admission:  IP CONSULT TO HOSPITALIST  IP CONSULT TO CARDIOLOGY  IP CONSULT TO DIETITIAN  IP CONSULT TO ELECTROPHYSIOLOGY  IP CONSULT TO HOME CARE NEEDS  IP CONSULT TO CARDIOLOGY    Discharge Instruction:   Follow up appointments:   Primary care physician:  within 2 weeks    Diet:  General/cardiac/ADA/as tolerated  Activity: {discharge activity: as tolerated  Disposition: Discharged to:   [x]Home, [x]C, []SNF, []Acute Rehab, []Hospice   Condition on discharge: Stable    Discharge Medications:      Anna Johnson   Home Medication Instructions M:[id number]    Printed on:04/22/19 7974   Medication Information                      amiodarone (CORDARONE) 200 MG tablet  Take 1 tablet by mouth 2 times daily             amoxicillin-clavulanate (AUGMENTIN) 875-125 MG per tablet  Take 1 tablet by mouth every 12 hours for 5 days             apixaban (ELIQUIS) 5 MG TABS tablet  Take one tablet twice daily             aspirin 81 MG chewable tablet  Take 81 mg by mouth daily. celecoxib (CELEBREX) 200 MG capsule  Take 200 mg by mouth 2 times daily.                diclofenac sodium 1 % GEL  Apply 2 g topically daily 04/16/19 Applies to lower back             diltiazem (CARDIZEM CD) 360 MG extended release capsule  TAKE ONE (1) CAPSULE BY MOUTH ONCE DAILY             ferrous sulfate 325 (65 FE) MG tablet  Take 325 mg by mouth daily (with breakfast)             furosemide (LASIX) 20 MG tablet  Take 1 tablet by mouth daily             gabapentin (NEURONTIN) 100 MG capsule  Take 100 mg by mouth nightly as needed              lactobacillus (CULTURELLE) capsule  Take 1 capsule by mouth daily (with breakfast)             levothyroxine (SYNTHROID) 100 MCG tablet  Take 100 mcg by mouth daily. metoprolol succinate (TOPROL XL) 25 MG extended release tablet  Take 0.5 tablets by mouth daily             multivitamin (THERAGRAN) per tablet  Take 1 tablet by mouth daily. omeprazole (PRILOSEC) 40 MG capsule  Take 40 mg by mouth daily             potassium chloride (KLOR-CON M) 10 MEQ extended release tablet  Take 1 tablet by mouth daily             pravastatin (PRAVACHOL) 20 MG tablet  Take 1 tablet by mouth daily             traMADol-acetaminophen (ULTRACET) 37.5-325 MG per tablet  1 tablet every 4 hours as needed              UNABLE TO FIND  Venancio B-150             vitamin B-12 (CYANOCOBALAMIN) 100 MCG tablet  Take 50 mcg by mouth daily. vitamin E 400 UNIT capsule  Take 400 Units by mouth daily. Objective Findings at Discharge:   /72   Pulse 94   Temp 99.1 °F (37.3 °C) (Rectal)   Resp 30   Ht 5' 6\" (1.676 m)   Wt 173 lb 1 oz (78.5 kg)   SpO2 97%   BMI 27.93 kg/m²            PHYSICAL EXAM   GEN Awake male, laying in bed in no apparent distress. Appears given age. EYES Pupils are equally round. No scleral discharge  HENT Atraumatic and symmetric head  NECK No apparent thyromegaly  RESP Symmetric chest movement while on room air. CARDIO/VASC Peripheral pulses equal bilaterally and palpable. positive peripheral edema. GI Abdomen is not distended. Rectal exam deferred.  Solorzano catheter is not present. HEME/LYMPH No petechiae or ecchymoses. MSK Spontaneous movement of BL upper extremities  SKIN Normal coloration, warm, dry. NEURO Cranial nerves appear grossly intact  PSYCH Awake, alert.     BMP/CBC  Recent Labs     04/20/19  0544 04/21/19  0340    142   K 3.6 4.0    105   CO2 26 23   BUN 37* 31*   CREATININE 0.9 0.9   WBC  --  12.0*   HCT  --  37.5*   PLT  --  391     SIGNIFICANT IMAGING AND LABS:      Discharge Time of 32 minutes    Electronically signed by Marcus Holloway MD on 4/22/2019 at 1:54

## 2019-04-22 NOTE — CARE COORDINATION
Pt has requested a walker. Pt's O2 has been ordered from [facility] by Pulmonary. Referral made to Nell[facility] for the walker. Informed her that pt is being d/c'd today. They will notify pt when the walker is ready. CM updated pt and his wife. ADL's and order faxed. [facility] has Demographics and insurance info.   TE

## 2019-04-22 NOTE — TELEPHONE ENCOUNTER
The patients daughter called and stated that Bebe Son stated to them on Thursday that he will need to have a heart surgery. The daughter wasn't happy about Travis Feliciano not get a call last week from the hospital and I explained that when there at the hospital we can put notes in but they need to speak with the nurse or charge nurse or nurse manger to have them get a hold of the doctor on call there at the hospital . The patient does have phenomena but they can't do surgery until he is clear. I scheduled the patient with Pratima Bearden so they can get in and see if he is going to need surgery for his leaky value per Bebe Son .    Suzan Turner the daughter [phone]

## 2019-04-22 NOTE — CARE COORDINATION
CM in to f/u with pt for d/c planning. Pt still plans to return home with his wife. [facility] offered and pt is agreeable. [facility] list provided. Pt states he has had [facility] before and would like to have them when d/c'd. Referral made to [facility]. Informed her that pt should be d/c'd today. Face sheet, H&P, HHC order and AVS faxed.   TE

## 2019-04-24 ENCOUNTER — OFFICE VISIT (OUTPATIENT)
Dept: CARDIOLOGY CLINIC | Age: 74
End: 2019-04-24
Payer: MEDICARE

## 2019-04-24 VITALS
HEIGHT: 66 IN | HEART RATE: 92 BPM | BODY MASS INDEX: 27.8 KG/M2 | DIASTOLIC BLOOD PRESSURE: 70 MMHG | SYSTOLIC BLOOD PRESSURE: 110 MMHG | WEIGHT: 173 LBS

## 2019-04-24 DIAGNOSIS — I47.1 ATRIAL TACHYCARDIA (HCC): ICD-10-CM

## 2019-04-24 DIAGNOSIS — E78.5 HYPERLIPIDEMIA, UNSPECIFIED HYPERLIPIDEMIA TYPE: ICD-10-CM

## 2019-04-24 DIAGNOSIS — Z86.79 HISTORY OF ATRIAL FIBRILLATION: ICD-10-CM

## 2019-04-24 DIAGNOSIS — I71.21 ASCENDING AORTIC ANEURYSM: ICD-10-CM

## 2019-04-24 DIAGNOSIS — J18.9 PNEUMONIA OF BOTH LUNGS DUE TO INFECTIOUS ORGANISM, UNSPECIFIED PART OF LUNG: ICD-10-CM

## 2019-04-24 DIAGNOSIS — I48.0 PAF (PAROXYSMAL ATRIAL FIBRILLATION) (HCC): Primary | ICD-10-CM

## 2019-04-24 DIAGNOSIS — I35.1 NONRHEUMATIC AORTIC VALVE INSUFFICIENCY: ICD-10-CM

## 2019-04-24 DIAGNOSIS — I48.0 PAROXYSMAL ATRIAL FIBRILLATION (HCC): ICD-10-CM

## 2019-04-24 PROCEDURE — 93000 ELECTROCARDIOGRAM COMPLETE: CPT | Performed by: INTERNAL MEDICINE

## 2019-04-24 PROCEDURE — 99214 OFFICE O/P EST MOD 30 MIN: CPT | Performed by: INTERNAL MEDICINE

## 2019-04-24 RX ORDER — AMIODARONE HYDROCHLORIDE 200 MG/1
200 TABLET ORAL DAILY
Qty: 30 TABLET | Refills: 3 | Status: SHIPPED | OUTPATIENT
Start: 2019-04-24

## 2019-04-24 NOTE — ASSESSMENT & PLAN NOTE
.  Extensive discussion about aortic aneurysm and aortic regurgitation. ROBB showed moderate to severe AI, but in setting of A. fib and rapid heart rate. Ideally he should get an echo when he is in sinus and lung situation is better. He is not hypoxic. I don't think he needs emergent or urgent surgery at this time.   We will follow up in about 6-8 weeks and Dr. Flako Norris get repeat echo

## 2019-04-24 NOTE — PROGRESS NOTES
CARDIOLOGY  NOTE    Chief Complaint: afib/chf    HPI:   Kendall Alfaro is a 75y.o. year old who has history as noted below. Louisa Landry He comes in after hospitalization for congestive heart failure, pneumonia and atrial fibrillation. He was evaluated by EPS services underwent ROBB which revealed moderate to severe aortic regurgitation. Louisa Landry He was cardioverted into sinus rhythm and started on amiodarone. He is on oxygen now, but he's feeling much better. Ankle swelling is improved.   He's very worried that he may need surgery for aortic valve    Current Outpatient Medications   Medication Sig Dispense Refill    amiodarone (CORDARONE) 200 MG tablet Take 1 tablet by mouth daily 30 tablet 3    amoxicillin-clavulanate (AUGMENTIN) 875-125 MG per tablet Take 1 tablet by mouth every 12 hours for 5 days 10 tablet 0    furosemide (LASIX) 20 MG tablet Take 1 tablet by mouth daily 60 tablet 3    lactobacillus (CULTURELLE) capsule Take 1 capsule by mouth daily (with breakfast) 30 capsule 0    potassium chloride (KLOR-CON M) 10 MEQ extended release tablet Take 1 tablet by mouth daily 60 tablet 3    metoprolol succinate (TOPROL XL) 25 MG extended release tablet Take 0.5 tablets by mouth daily 15 tablet 0    apixaban (ELIQUIS) 5 MG TABS tablet Take one tablet twice daily 56 tablet 0    pravastatin (PRAVACHOL) 20 MG tablet Take 1 tablet by mouth daily (Patient taking differently: Take 20 mg by mouth nightly ) 30 tablet 5    diltiazem (CARDIZEM CD) 360 MG extended release capsule TAKE ONE (1) CAPSULE BY MOUTH ONCE DAILY 30 capsule 9    traMADol-acetaminophen (ULTRACET) 37.5-325 MG per tablet 1 tablet every 4 hours as needed       omeprazole (PRILOSEC) 40 MG capsule Take 40 mg by mouth daily      ferrous sulfate 325 (65 FE) MG tablet Take 325 mg by mouth daily (with breakfast)      gabapentin (NEURONTIN) 100 MG capsule Take 100 mg by mouth nightly as needed       UNABLE TO FIND Venancio B-150  diclofenac sodium 1 % GEL Apply 2 g topically daily 04/16/19 Applies to lower back      multivitamin (THERAGRAN) per tablet Take 1 tablet by mouth daily.  vitamin E 400 UNIT capsule Take 400 Units by mouth daily.  vitamin B-12 (CYANOCOBALAMIN) 100 MCG tablet Take 50 mcg by mouth daily.  celecoxib (CELEBREX) 200 MG capsule Take 200 mg by mouth 2 times daily.  levothyroxine (SYNTHROID) 100 MCG tablet Take 100 mcg by mouth daily.  aspirin 81 MG chewable tablet Take 81 mg by mouth daily. No current facility-administered medications for this visit. Allergies:   Codeine and Vicodin [hydrocodone-acetaminophen]    Patient History:  Past Medical History:   Diagnosis Date    Aneurysm ([facility] [address]     12 YEARS AGO, NON SURGICAL    Arthritis     Atrial fibrillation (HCC)     H/O 24 hour EKG monitoring 05-    PRED. RHYTHM SR W/INFREQUENT VENT. AND SUPRAVENT ECTOPY W/SHORT RUNS OF SVT. LONG RUN 10 BEATS.    H/O cardiovascular stress test 05-    [facility]) NORMAL. EF 56%.  H/O cardiovascular stress test 6/6/2016    lexiscan-normal,EF51%    H/O echocardiogram 04/18/2019    EF 55-60%, Mod to severe aortic insufficiency. dilated aortic root. Positive bubble study.    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    Thyroid disease     HYPOTHYROIDISM     Past Surgical History:   Procedure Laterality Date    ABLATION OF DYSRHYTHMIC FOCUS      ATRIAL ABLATION SURGERY N/A 08/16/2016    atrial fibrillation ablation    BRAIN ANEURYSM SURGERY      has coil    CATARACT REMOVAL  2012    HERNIA REPAIR      LEFT    KNEE SURGERY  1977    knee  left    KNEE SURGERY      VITRECTOMY  05-    LEFT EYE Family History   Problem Relation Age of Onset    High Blood Pressure Mother      Social History     Tobacco Use    Smoking status: Never Smoker    Smokeless tobacco: Never Used    Tobacco comment: reviewed   Substance Use Topics    Alcohol use: No        Review of Systems:   · Constitutional: No Fever or Weight Loss   · Eyes: No Decreased Vision  · ENT: No Headaches, Hearing Loss or Vertigo  · Cardiovascular: as per note above   · Respiratory: No cough or wheezing and as per note above. · Gastrointestinal: No abdominal pain, appetite loss, blood in stools, constipation, diarrhea or heartburn  · Genitourinary: No dysuria, trouble voiding, or hematuria  · Musculoskeletal:  None  · Integumentary: No rash or pruritis  · Neurological: No TIA or stroke symptoms  · Psychiatric: No anxiety or depression  · Endocrine: No malaise, fatigue or temperature intolerance  · Hematologic/Lymphatic: No bleeding problems, blood clots or swollen lymph nodes  · Allergic/Immunologic: No nasal congestion or hives    Objective:      Physical Exam:  /70   Pulse 92   Ht 5' 6\" (1.676 m)   Wt 173 lb (78.5 kg)   BMI 27.92 kg/m²   Wt Readings from Last 3 Encounters:   04/24/19 179 lb (81.2 kg)   04/24/19 173 lb (78.5 kg)   04/22/19 173 lb 1 oz (78.5 kg)     Body mass index is 27.92 kg/m². Vitals:    04/24/19 1240   BP: 110/70   Pulse: 92        General Appearance:  No distress, conversant  Constitutional:  Well developed, Well nourished, No acute distress, Non-toxic appearance. HENT:  Normocephalic, Atraumatic, Bilateral external ears normal, Oropharynx moist, No oral exudates, Nose normal. Neck- Normal range of motion, No tenderness, Supple, No stridor,no apical-carotid delay  Eyes:  PERRL, EOMI, Conjunctiva normal, No discharge. Respiratory:  Normal breath sounds, No respiratory distress, No wheezing, No chest tenderness. ,no use of accessory muscles, NO crackles  Cardiovascular: (PMI) apex non displaced,no lifts no

## 2019-04-24 NOTE — ASSESSMENT & PLAN NOTE
He is in sinus rhythm today. He is on amiodarone. I would reduce it to once a day.   He has seen EPS , will continue to monitor, needs annual tsh , eye check and pulmonary function

## 2019-04-24 NOTE — PROGRESS NOTES
[id number] Score for Atrial Fibrillation Stroke Risk   Risk   Factors  Component Value   C CHF Yes 1   H HTN No 0   A2 Age >= 76 Yes,  (68 y.o.) 2   D DM No 0   S2 Prior Stroke/TIA No 0   V Vascular Disease No 0   A Age 74-69 No,  (68 y.o.) 0   Sc Sex male 0    [id number]  Score  3   Score last updated 5/67/56 44:97 PM    Click here for a link to the UpToDate guideline \"Atrial Fibrillation: Anticoagulation therapy to prevent embolization    Disclaimer: Risk Score calculation is dependent on accuracy of patient problem list and past encounter diagnosis.

## 2019-04-26 LAB
CULTURE: NORMAL
CULTURE: NORMAL
Lab: NORMAL
Lab: NORMAL
SPECIMEN: NORMAL
SPECIMEN: NORMAL

## 2019-05-15 ENCOUNTER — HOSPITAL ENCOUNTER (OUTPATIENT)
Age: 74
Discharge: HOME | End: 2019-05-15
Payer: MEDICARE

## 2019-05-15 ENCOUNTER — HOSPITAL ENCOUNTER (OUTPATIENT)
Dept: GENERAL RADIOLOGY | Age: 74
Discharge: HOME | End: 2019-05-15
Payer: MEDICARE

## 2019-05-15 DIAGNOSIS — J18.9 PNEUMONIA OF BOTH LUNGS DUE TO INFECTIOUS ORGANISM, UNSPECIFIED PART OF LUNG: ICD-10-CM

## 2019-05-15 PROCEDURE — 71046 X-RAY EXAM CHEST 2 VIEWS: CPT

## 2019-05-20 ENCOUNTER — TELEPHONE (OUTPATIENT)
Dept: CARDIOLOGY CLINIC | Age: 74
End: 2019-05-20

## 2019-05-20 DIAGNOSIS — Z98.890 S/P ABLATION OF ATRIAL FIBRILLATION: ICD-10-CM

## 2019-05-20 DIAGNOSIS — I48.19 PERSISTENT ATRIAL FIBRILLATION (HCC): ICD-10-CM

## 2019-05-20 DIAGNOSIS — Z86.79 S/P ABLATION OF ATRIAL FIBRILLATION: ICD-10-CM

## 2019-05-22 PROBLEM — I27.20 PULMONARY HYPERTENSION (HCC): Status: ACTIVE | Noted: 2019-05-22

## 2019-05-22 PROBLEM — J98.01 BRONCHOSPASM: Status: ACTIVE | Noted: 2019-05-22

## 2019-06-05 ENCOUNTER — OFFICE VISIT (OUTPATIENT)
Dept: CARDIOLOGY CLINIC | Age: 74
End: 2019-06-05
Payer: MEDICARE

## 2019-06-05 VITALS
WEIGHT: 175 LBS | HEIGHT: 66 IN | BODY MASS INDEX: 28.12 KG/M2 | SYSTOLIC BLOOD PRESSURE: 120 MMHG | DIASTOLIC BLOOD PRESSURE: 60 MMHG | HEART RATE: 85 BPM

## 2019-06-05 DIAGNOSIS — R06.02 SHORTNESS OF BREATH: ICD-10-CM

## 2019-06-05 PROCEDURE — 99214 OFFICE O/P EST MOD 30 MIN: CPT | Performed by: INTERNAL MEDICINE

## 2019-06-05 PROCEDURE — 93000 ELECTROCARDIOGRAM COMPLETE: CPT | Performed by: INTERNAL MEDICINE

## 2019-06-05 NOTE — PROGRESS NOTES
CARDIOLOGY NOTE      6/5/2019    RE: Caron Gerber  (1945)                               TO:  Dr. Promise Quevedo MD            CHIEF COMPLAINT   Aracelis Daniel is a 75 y.o. male who was seen today for management of  vhd                                    HPI:   Patient is here for    - VHD  Has AR  - Hypertension,is  well controlled, pt is  compliant with medicines  - Hyperlipidimea, lipids are in acceptable range. Pt  is  compliant with medicines  - Atrial fibrillation, pt is  compliant with meds.  Patient does not have symptoms from atrial fibrillation                The patient does not have cardiac complaints    Caron Gerber has the following history recorded in care path:  Patient Active Problem List    Diagnosis Date Noted    Bronchospasm 05/22/2019    Pulmonary hypertension ([facility] [address] 05/22/2019    Nonrheumatic aortic valve insufficiency 04/24/2019    Ascending aortic aneurysm ([address] 04/24/2019    Pneumonia of both lungs due to infectious organism 04/24/2019    Atrial tachycardia ([address]     Atrial fibrillation ([address] 04/16/2019    S/P ablation of atrial fibrillation     Persistent atrial fibrillation (HCC) 05/31/2016    Persistent atrial fibrillation (HCC)     History of atrial fibrillation     Hyperlipidemia     Aneurysm ([address]     ICH (intracerebral hemorrhage) (HCC) 04/16/2012     Current Outpatient Medications   Medication Sig Dispense Refill    apixaban (ELIQUIS) 5 MG TABS tablet Take one tablet twice daily 56 tablet 0    amiodarone (CORDARONE) 200 MG tablet Take 1 tablet by mouth daily 30 tablet 3    furosemide (LASIX) 20 MG tablet Take 1 tablet by mouth daily 60 tablet 3    lactobacillus (CULTURELLE) capsule Take 1 capsule by mouth daily (with breakfast) 30 capsule 0    potassium chloride (KLOR-CON M) 10 MEQ extended release tablet Take 1 tablet by mouth daily 60 tablet 3    pravastatin (PRAVACHOL) 20 MG tablet Take 1 tablet by mouth daily (Patient taking differently:  Rotator cuff injury     [facility]-    PARS PLANA VITRECTOMY LEFT EYE    Thyroid disease     HYPOTHYROIDISM     Past Surgical History:   Procedure Laterality Date    ABLATION OF DYSRHYTHMIC FOCUS      ATRIAL ABLATION SURGERY N/A 08/16/2016    atrial fibrillation ablation    BRAIN ANEURYSM SURGERY      has coil    CATARACT REMOVAL  2012    HERNIA REPAIR      LEFT    KNEE SURGERY  1977    knee  left    KNEE SURGERY      VITRECTOMY  05-    LEFT EYE       As reviewed   Family History   Problem Relation Age of Onset    High Blood Pressure Mother      Social History     Tobacco Use    Smoking status: Never Smoker    Smokeless tobacco: Never Used    Tobacco comment: reviewed   Substance Use Topics    Alcohol use: No      Review of Systems:    Constitutional: Negative for diaphoresis and fatigue  Psychological:Negative for anxiety or depression  HENT: Negative for headaches, nasal congestion, sinus pain or vertigo  Eyes: Negative for visual disturbance. Endocrine: Negative for polydipsia/polyuria  Respiratory: Negative for shortness of breath  Cardiovascular: Negative for chest pain, dyspnea on exertion, claudication, edema, irregular heartbeat, murmur, palpitations or shortness of breath  Gastrointestinal: Negative for abdominal pain or heartburn  Genito-Urinary: Negative for urinary frequency/urgency  Musculoskeletal: Negative for muscle pain, muscular weakness, negative for pain in arm and leg or swelling in foot and leg  Neurological: Negative for dizziness, headaches, memory loss, numbness/tingling, visual changes, syncope  Dermatological: Negative for rash    Objective:  /60   Pulse 85   Ht 5' 6\" (1.676 m)   Wt 175 lb (79.4 kg)   BMI 28.25 kg/m²   Wt Readings from Last 3 Encounters:   06/05/19 175 lb (79.4 kg)   05/22/19 179 lb (81.2 kg)   04/24/19 179 lb (81.2 kg)     Body mass index is 28.25 kg/m².   GENERAL - Alert, oriented, pleasant, in no apparent distress. EYES: No jaundice, no conjunctival pallor. SKIN: It is warm & dry. No rashes. No Echhymosis    HEENT - No clinically significant abnormalities seen. Neck - Supple. No jugular venous distention noted. No carotid bruits. Cardiovascular - Normal S1 and S2 without obvious murmur or gallop. Extremities - No cyanosis, clubbing, or significant edema. Pulmonary - No respiratory distress. No wheezes or rales. Abdomen - No masses, tenderness, or organomegaly. Musculoskeletal - No significant edema. No joint deformities. No muscle wasting. Neurologic - Cranial nerves II through XII are grossly intact. There were no gross focal neurologic abnormalities. Lab Review   Lab Results   Component Value Date    TROPONINT <0.010 04/17/2019     BNP:  No results found for: BNP  PT/INR:    Lab Results   Component Value Date    INR 1.79 04/16/2019     No results found for: LABA1C  Lab Results   Component Value Date    WBC 12.0 (H) 04/21/2019    HCT 37.5 (L) 04/21/2019    .2 (H) 04/21/2019     04/21/2019     Lab Results   Component Value Date    CHOL 101 04/17/2019    TRIG 66 04/17/2019    HDL 48 04/17/2019    LDLCALC 50 04/09/2019    LDLDIRECT 52 04/17/2019     Lab Results   Component Value Date    ALT 14 04/16/2019    AST 28 04/16/2019     BMP:    Lab Results   Component Value Date     04/21/2019    K 4.0 04/21/2019     04/21/2019    CO2 23 04/21/2019    BUN 31 04/21/2019    CREATININE 0.9 04/21/2019     CMP:   Lab Results   Component Value Date     04/21/2019    K 4.0 04/21/2019     04/21/2019    CO2 23 04/21/2019    BUN 31 04/21/2019    PROT 7.2 04/16/2019    PROT 7.3 06/23/2012     TSH:    Lab Results   Component Value Date    TSH 0.722 04/09/2019           Impression:    1.  Shortness of breath       Patient Active Problem List   Diagnosis Code    ICH (intracerebral hemorrhage) ([facility] [address] I61.9    History of atrial fibrillation Z86.79    Hyperlipidemia E78.5    Aneurysm (HCC) I72.9    Persistent atrial fibrillation (HCC) I48.1    Persistent atrial fibrillation (HCC) I48.1    S/P ablation of atrial fibrillation Z98.890, Z86.79    Atrial fibrillation (HCC) I48.91    Atrial tachycardia (HCC) I47.1    Nonrheumatic aortic valve insufficiency I35.1    Ascending aortic aneurysm (HCC) I71.2    Pneumonia of both lungs due to infectious organism J18.9    Bronchospasm J98.01    Pulmonary hypertension (HCC) I27.20       Assessment & Plan:    -  Hypertension: Patients blood pressure is normal. Patient is advised about low sodium diet. Present medical regimen will not be changed. - Atrial fibrillation, pt is  compliant with meds. Patient does not have symptoms from atrial fibrillation        - VHD  AR  Echo yearly    - on O2 for COPD        -  LIPID MANAGEMENT:  Available lipid  lab data reviewed  and patient was given dietary advice. NCEP- ATP III guidelines reviewed with patient. -   Changes  in medicines made: No                                                   CLASS I  1. Treatment of hypertension (systolic BP >726 mm Hg) is recommended  in patients with chronic AR (stages B and C), preferably  with dihydropyridine calcium channel blockers or ACE  inhibitors/ARBs (204,209). (Level of Evidence: B)  Vasodilating drugs are effective in reducing systolic BP in  patients with chronic AR. Beta blockers may be less effective  because the reduction in heart rate is associated with an even  higher stroke volume, which contributes to the elevated  systolic pressure in patients with chronic severe AR. Supporting References: [phone])  CLASS IIa  1.  Medical therapy with ACE inhibitors/ARBs and beta blockers  is reasonable in patients with severe AR who have symptoms  and/or LV dysfunction (stages C2 and D) when surgery is  not performed because of comorbidities Lucretia Halsted  [facility] [place] [place]

## 2019-06-19 ENCOUNTER — TELEPHONE (OUTPATIENT)
Dept: CARDIOLOGY CLINIC | Age: 74
End: 2019-06-19

## 2019-06-19 RX ORDER — PRAVASTATIN SODIUM 20 MG
TABLET ORAL
Qty: 90 TABLET | Refills: 3 | Status: SHIPPED | OUTPATIENT
Start: 2019-06-19

## 2019-06-20 DIAGNOSIS — Z98.890 S/P ABLATION OF ATRIAL FIBRILLATION: ICD-10-CM

## 2019-06-20 DIAGNOSIS — I48.19 PERSISTENT ATRIAL FIBRILLATION (HCC): ICD-10-CM

## 2019-06-20 DIAGNOSIS — Z86.79 S/P ABLATION OF ATRIAL FIBRILLATION: ICD-10-CM

## 2019-07-19 ENCOUNTER — HOSPITAL ENCOUNTER (OUTPATIENT)
Age: 74
Discharge: HOME | End: 2019-07-19
Payer: MEDICARE

## 2019-07-19 ENCOUNTER — HOSPITAL ENCOUNTER (OUTPATIENT)
Dept: GENERAL RADIOLOGY | Age: 74
Discharge: HOME | End: 2019-07-19
Payer: MEDICARE

## 2019-07-19 DIAGNOSIS — J18.9 PNEUMONIA OF BOTH LUNGS DUE TO INFECTIOUS ORGANISM, UNSPECIFIED PART OF LUNG: ICD-10-CM

## 2019-07-19 PROCEDURE — 71046 X-RAY EXAM CHEST 2 VIEWS: CPT

## 2019-07-23 ENCOUNTER — TELEPHONE (OUTPATIENT)
Dept: CARDIOLOGY CLINIC | Age: 74
End: 2019-07-23

## 2019-07-23 DIAGNOSIS — I48.19 PERSISTENT ATRIAL FIBRILLATION (HCC): ICD-10-CM

## 2019-07-23 DIAGNOSIS — Z86.79 S/P ABLATION OF ATRIAL FIBRILLATION: ICD-10-CM

## 2019-07-23 DIAGNOSIS — Z98.890 S/P ABLATION OF ATRIAL FIBRILLATION: ICD-10-CM

## 2019-08-12 ENCOUNTER — HOSPITAL ENCOUNTER (OUTPATIENT)
Dept: CARDIAC REHAB | Age: 74
Discharge: HOME | End: 2019-08-12
Payer: MEDICARE

## 2019-08-12 PROCEDURE — 94060 EVALUATION OF WHEEZING: CPT

## 2019-08-12 PROCEDURE — 94727 GAS DIL/WSHOT DETER LNG VOL: CPT

## 2019-08-12 PROCEDURE — 6360000002 HC RX W HCPCS

## 2019-08-12 PROCEDURE — 94729 DIFFUSING CAPACITY: CPT

## 2019-08-12 PROCEDURE — 94760 N-INVAS EAR/PLS OXIMETRY 1: CPT

## 2019-08-12 PROCEDURE — 94640 AIRWAY INHALATION TREATMENT: CPT

## 2019-08-12 NOTE — PROGRESS NOTES
Patient ambulated on room air with Sao2 dropping to 83%O2 reapplied  @2lnc while ambulating with Sao2 90-92%

## 2019-08-13 DIAGNOSIS — Z86.79 S/P ABLATION OF ATRIAL FIBRILLATION: ICD-10-CM

## 2019-08-13 DIAGNOSIS — I48.19 PERSISTENT ATRIAL FIBRILLATION (HCC): ICD-10-CM

## 2019-08-13 DIAGNOSIS — Z98.890 S/P ABLATION OF ATRIAL FIBRILLATION: ICD-10-CM

## 2019-08-19 PROBLEM — J44.9 MODERATE COPD (CHRONIC OBSTRUCTIVE PULMONARY DISEASE) (HCC): Status: ACTIVE | Noted: 2019-08-19

## 2019-08-19 PROBLEM — J96.10 CHRONIC RESPIRATORY FAILURE (HCC): Status: ACTIVE | Noted: 2019-08-19

## 2019-08-20 ENCOUNTER — TELEPHONE (OUTPATIENT)
Dept: CARDIOLOGY CLINIC | Age: 74
End: 2019-08-20

## 2019-08-20 NOTE — TELEPHONE ENCOUNTER
Pt called and states Lung  put him on Symbicort  In [facility]. Pt wants to know if ok to use,due to heart problems and other meds he is on. Please advise.  Call [phone]

## 2019-08-29 ENCOUNTER — HOSPITAL ENCOUNTER (EMERGENCY)
Age: 74
Discharge: HOME | End: 2019-08-30
Payer: MEDICARE

## 2019-08-29 VITALS
DIASTOLIC BLOOD PRESSURE: 92 MMHG | TEMPERATURE: 97.9 F | HEART RATE: 82 BPM | SYSTOLIC BLOOD PRESSURE: 106 MMHG | BODY MASS INDEX: 28.93 KG/M2 | OXYGEN SATURATION: 94 % | RESPIRATION RATE: 18 BRPM | HEIGHT: 66 IN | WEIGHT: 180 LBS

## 2019-08-29 DIAGNOSIS — S51.812A LACERATION OF LEFT FOREARM, INITIAL ENCOUNTER: Primary | ICD-10-CM

## 2019-08-29 PROCEDURE — 4500000027

## 2019-08-29 PROCEDURE — 99282 EMERGENCY DEPT VISIT SF MDM: CPT

## 2019-08-29 ASSESSMENT — PAIN DESCRIPTION - ORIENTATION: ORIENTATION: LEFT

## 2019-08-29 ASSESSMENT — PAIN SCALES - GENERAL: PAINLEVEL_OUTOF10: 5

## 2019-08-29 ASSESSMENT — PAIN DESCRIPTION - LOCATION: LOCATION: ARM

## 2019-08-29 ASSESSMENT — PAIN DESCRIPTION - PAIN TYPE: TYPE: ACUTE PAIN

## 2019-08-30 PROCEDURE — 2500000003 HC RX 250 WO HCPCS: Performed by: PHYSICIAN ASSISTANT

## 2019-08-30 RX ORDER — LIDOCAINE HYDROCHLORIDE AND EPINEPHRINE BITARTRATE 20; .01 MG/ML; MG/ML
20 INJECTION, SOLUTION SUBCUTANEOUS ONCE
Status: COMPLETED | OUTPATIENT
Start: 2019-08-30 | End: 2019-08-30

## 2019-08-30 RX ADMIN — LIDOCAINE HYDROCHLORIDE AND EPINEPHRINE 20 ML: 20; 10 INJECTION, SOLUTION INFILTRATION; PERINEURAL at 01:25

## 2019-08-30 ASSESSMENT — PAIN SCALES - GENERAL: PAINLEVEL_OUTOF10: 5

## 2019-08-30 NOTE — ED TRIAGE NOTES
Pt reports tripping in garage causing lac to L FA. Actively bleeding at this time with pt applying pressure. Pt currently taking blood thinner as well.

## 2019-08-30 NOTE — ED PROVIDER NOTES
 CATARACT REMOVAL  2012    HERNIA REPAIR      LEFT    KNEE SURGERY  1977    knee  left    KNEE SURGERY      VITRECTOMY  05-    LEFT EYE        CURRENT MEDICATIONS    Current Outpatient Rx   Medication Sig Dispense Refill    OXYGEN Inhale 2 L into the lungs continuous      metoprolol tartrate (LOPRESSOR) 25 MG tablet Take 12.5 mg by mouth 2 times daily      budesonide-formoterol (SYMBICORT) 80-4.5 MCG/ACT AERO Inhale 2 puffs into the lungs 2 times daily 1 Inhaler 5    apixaban (ELIQUIS) 5 MG TABS tablet Take one tablet twice daily 56 tablet 0    pravastatin (PRAVACHOL) 20 MG tablet TAKE ONE (1) TABLET BY MOUTH ONCE DAILY 90 tablet 3    amiodarone (CORDARONE) 200 MG tablet Take 1 tablet by mouth daily 30 tablet 3    furosemide (LASIX) 20 MG tablet Take 1 tablet by mouth daily 60 tablet 3    lactobacillus (CULTURELLE) capsule Take 1 capsule by mouth daily (with breakfast) 30 capsule 0    potassium chloride (KLOR-CON M) 10 MEQ extended release tablet Take 1 tablet by mouth daily 60 tablet 3    diltiazem (CARDIZEM CD) 360 MG extended release capsule TAKE ONE (1) CAPSULE BY MOUTH ONCE DAILY 30 capsule 9    traMADol-acetaminophen (ULTRACET) 37.5-325 MG per tablet 1 tablet every 4 hours as needed       omeprazole (PRILOSEC) 40 MG capsule Take 40 mg by mouth daily      ferrous sulfate 325 (65 FE) MG tablet Take 325 mg by mouth daily (with breakfast)      gabapentin (NEURONTIN) 100 MG capsule Take 100 mg by mouth nightly as needed       UNABLE TO FIND Venancio B-150      diclofenac sodium 1 % GEL Apply 2 g topically daily 04/16/19 Applies to lower back      multivitamin (THERAGRAN) per tablet Take 1 tablet by mouth daily.  vitamin E 400 UNIT capsule Take 400 Units by mouth daily.  vitamin B-12 (CYANOCOBALAMIN) 100 MCG tablet Take 50 mcg by mouth daily.  celecoxib (CELEBREX) 200 MG capsule Take 200 mg by mouth 2 times daily.         levothyroxine (SYNTHROID) 100 MCG tablet Take

## 2019-09-10 DIAGNOSIS — Z86.79 S/P ABLATION OF ATRIAL FIBRILLATION: ICD-10-CM

## 2019-09-10 DIAGNOSIS — I48.19 PERSISTENT ATRIAL FIBRILLATION (HCC): ICD-10-CM

## 2019-09-10 DIAGNOSIS — Z98.890 S/P ABLATION OF ATRIAL FIBRILLATION: ICD-10-CM

## 2019-09-17 RX ORDER — DILTIAZEM HYDROCHLORIDE 360 MG/1
360 CAPSULE, EXTENDED RELEASE ORAL DAILY
Qty: 30 CAPSULE | Refills: 8 | Status: SHIPPED | OUTPATIENT
Start: 2019-09-17 | End: 2020-06-12

## 2019-10-07 ENCOUNTER — TELEPHONE (OUTPATIENT)
Dept: CARDIOLOGY CLINIC | Age: 74
End: 2019-10-07

## 2019-10-07 DIAGNOSIS — I48.19 PERSISTENT ATRIAL FIBRILLATION (HCC): ICD-10-CM

## 2019-10-07 DIAGNOSIS — Z98.890 S/P ABLATION OF ATRIAL FIBRILLATION: ICD-10-CM

## 2019-10-07 DIAGNOSIS — Z86.79 S/P ABLATION OF ATRIAL FIBRILLATION: ICD-10-CM

## 2019-11-04 ENCOUNTER — OFFICE VISIT (OUTPATIENT)
Dept: CARDIOLOGY CLINIC | Age: 74
End: 2019-11-04
Payer: MEDICARE

## 2019-11-04 ENCOUNTER — TELEPHONE (OUTPATIENT)
Dept: CARDIOLOGY CLINIC | Age: 74
End: 2019-11-04

## 2019-11-04 VITALS
HEART RATE: 80 BPM | SYSTOLIC BLOOD PRESSURE: 134 MMHG | WEIGHT: 184 LBS | BODY MASS INDEX: 29.57 KG/M2 | HEIGHT: 66 IN | DIASTOLIC BLOOD PRESSURE: 74 MMHG

## 2019-11-04 DIAGNOSIS — Z86.79 S/P ABLATION OF ATRIAL FIBRILLATION: ICD-10-CM

## 2019-11-04 DIAGNOSIS — R06.02 SHORTNESS OF BREATH: ICD-10-CM

## 2019-11-04 DIAGNOSIS — I48.19 PERSISTENT ATRIAL FIBRILLATION (HCC): ICD-10-CM

## 2019-11-04 DIAGNOSIS — Z98.890 S/P ABLATION OF ATRIAL FIBRILLATION: ICD-10-CM

## 2019-11-04 PROCEDURE — 93000 ELECTROCARDIOGRAM COMPLETE: CPT | Performed by: NURSE PRACTITIONER

## 2019-11-04 PROCEDURE — 99202 OFFICE O/P NEW SF 15 MIN: CPT | Performed by: INTERNAL MEDICINE

## 2019-11-13 ENCOUNTER — OFFICE VISIT (OUTPATIENT)
Dept: CARDIOLOGY CLINIC | Age: 74
End: 2019-11-13
Payer: MEDICARE

## 2019-11-13 VITALS
HEIGHT: 66 IN | DIASTOLIC BLOOD PRESSURE: 80 MMHG | BODY MASS INDEX: 29.83 KG/M2 | WEIGHT: 185.6 LBS | SYSTOLIC BLOOD PRESSURE: 110 MMHG | HEART RATE: 80 BPM

## 2019-11-13 DIAGNOSIS — R06.02 SOB (SHORTNESS OF BREATH): Primary | ICD-10-CM

## 2019-11-13 PROCEDURE — 99214 OFFICE O/P EST MOD 30 MIN: CPT | Performed by: INTERNAL MEDICINE

## 2019-11-18 ENCOUNTER — HOSPITAL ENCOUNTER (OUTPATIENT)
Age: 74
Discharge: HOME | End: 2019-11-18
Payer: MEDICARE

## 2019-11-18 ENCOUNTER — PROCEDURE VISIT (OUTPATIENT)
Dept: CARDIOLOGY CLINIC | Age: 74
End: 2019-11-18
Payer: MEDICARE

## 2019-11-18 DIAGNOSIS — R06.02 SOB (SHORTNESS OF BREATH): Primary | ICD-10-CM

## 2019-11-18 LAB
BASOPHILS ABSOLUTE: 0.1 K/CU MM
BASOPHILS RELATIVE PERCENT: 0.8 % (ref 0–1)
DIFFERENTIAL TYPE: ABNORMAL
EOSINOPHILS ABSOLUTE: 0.2 K/CU MM
EOSINOPHILS RELATIVE PERCENT: 2.3 % (ref 0–3)
HCT VFR BLD CALC: 46.7 % (ref 42–52)
HEMOGLOBIN: 14.8 GM/DL (ref 13.5–18)
IMMATURE NEUTROPHIL %: 0.4 % (ref 0–0.43)
LV EF: 53 %
LVEF MODALITY: NORMAL
LYMPHOCYTES ABSOLUTE: 2 K/CU MM
LYMPHOCYTES RELATIVE PERCENT: 26 % (ref 24–44)
MCH RBC QN AUTO: 32.6 PG (ref 27–31)
MCHC RBC AUTO-ENTMCNC: 31.7 % (ref 32–36)
MCV RBC AUTO: 102.9 FL (ref 78–100)
MONOCYTES ABSOLUTE: 0.9 K/CU MM
MONOCYTES RELATIVE PERCENT: 12.2 % (ref 0–4)
NUCLEATED RBC %: 0 %
PDW BLD-RTO: 13.5 % (ref 11.7–14.9)
PLATELET # BLD: 242 K/CU MM (ref 140–440)
PMV BLD AUTO: 10.5 FL (ref 7.5–11.1)
PRO-BNP: 1322 PG/ML
RBC # BLD: 4.54 M/CU MM (ref 4.6–6.2)
SEGMENTED NEUTROPHILS ABSOLUTE COUNT: 4.5 K/CU MM
SEGMENTED NEUTROPHILS RELATIVE PERCENT: 58.3 % (ref 36–66)
TOTAL IMMATURE NEUTOROPHIL: 0.03 K/CU MM
TOTAL NUCLEATED RBC: 0 K/CU MM
WBC # BLD: 7.7 K/CU MM (ref 4–10.5)

## 2019-11-18 PROCEDURE — 83880 ASSAY OF NATRIURETIC PEPTIDE: CPT

## 2019-11-18 PROCEDURE — 85025 COMPLETE CBC W/AUTO DIFF WBC: CPT

## 2019-11-18 PROCEDURE — 93306 TTE W/DOPPLER COMPLETE: CPT | Performed by: INTERNAL MEDICINE

## 2019-11-18 PROCEDURE — 36415 COLL VENOUS BLD VENIPUNCTURE: CPT

## 2019-11-19 ENCOUNTER — TELEPHONE (OUTPATIENT)
Dept: CARDIOLOGY CLINIC | Age: 74
End: 2019-11-19

## 2019-11-19 DIAGNOSIS — R06.02 SOB (SHORTNESS OF BREATH): ICD-10-CM

## 2019-11-19 DIAGNOSIS — I27.20 PULMONARY HYPERTENSION (HCC): Primary | ICD-10-CM

## 2019-11-21 ENCOUNTER — TELEPHONE (OUTPATIENT)
Dept: CARDIOLOGY CLINIC | Age: 74
End: 2019-11-21

## 2019-11-27 ENCOUNTER — HOSPITAL ENCOUNTER (OUTPATIENT)
Dept: CARDIAC REHAB | Age: 74
Setting detail: THERAPIES SERIES
Discharge: HOME | End: 2019-11-27
Payer: MEDICARE

## 2019-12-02 ENCOUNTER — TELEPHONE (OUTPATIENT)
Dept: CARDIOLOGY CLINIC | Age: 74
End: 2019-12-02

## 2019-12-02 ENCOUNTER — HOSPITAL ENCOUNTER (OUTPATIENT)
Dept: CARDIAC REHAB | Age: 74
Setting detail: THERAPIES SERIES
Discharge: HOME | End: 2019-12-02
Payer: MEDICARE

## 2019-12-02 DIAGNOSIS — Z98.890 S/P ABLATION OF ATRIAL FIBRILLATION: ICD-10-CM

## 2019-12-02 DIAGNOSIS — I48.19 PERSISTENT ATRIAL FIBRILLATION (HCC): ICD-10-CM

## 2019-12-02 DIAGNOSIS — Z86.79 S/P ABLATION OF ATRIAL FIBRILLATION: ICD-10-CM

## 2019-12-02 PROCEDURE — G0424 PULMONARY REHAB W EXER: HCPCS

## 2019-12-03 ENCOUNTER — OFFICE VISIT (OUTPATIENT)
Dept: CARDIOLOGY CLINIC | Age: 74
End: 2019-12-03
Payer: MEDICARE

## 2019-12-03 ENCOUNTER — HOSPITAL ENCOUNTER (OUTPATIENT)
Dept: CARDIAC REHAB | Age: 74
Setting detail: THERAPIES SERIES
Discharge: HOME | End: 2019-12-03
Payer: MEDICARE

## 2019-12-03 VITALS
SYSTOLIC BLOOD PRESSURE: 136 MMHG | DIASTOLIC BLOOD PRESSURE: 82 MMHG | WEIGHT: 182.2 LBS | BODY MASS INDEX: 29.28 KG/M2 | HEIGHT: 66 IN | HEART RATE: 80 BPM

## 2019-12-03 DIAGNOSIS — I48.0 PAROXYSMAL ATRIAL FIBRILLATION (HCC): Primary | ICD-10-CM

## 2019-12-03 PROCEDURE — 99214 OFFICE O/P EST MOD 30 MIN: CPT | Performed by: INTERNAL MEDICINE

## 2019-12-03 PROCEDURE — G0424 PULMONARY REHAB W EXER: HCPCS

## 2019-12-05 ENCOUNTER — HOSPITAL ENCOUNTER (OUTPATIENT)
Dept: CARDIAC REHAB | Age: 74
Setting detail: THERAPIES SERIES
Discharge: HOME | End: 2019-12-05
Payer: MEDICARE

## 2019-12-05 PROCEDURE — G0424 PULMONARY REHAB W EXER: HCPCS

## 2019-12-09 ENCOUNTER — HOSPITAL ENCOUNTER (OUTPATIENT)
Dept: CARDIAC REHAB | Age: 74
Setting detail: THERAPIES SERIES
Discharge: HOME | End: 2019-12-09
Payer: MEDICARE

## 2019-12-09 PROCEDURE — G0424 PULMONARY REHAB W EXER: HCPCS

## 2019-12-10 ENCOUNTER — HOSPITAL ENCOUNTER (OUTPATIENT)
Dept: CARDIAC REHAB | Age: 74
Setting detail: THERAPIES SERIES
Discharge: HOME | End: 2019-12-10
Payer: MEDICARE

## 2019-12-10 PROCEDURE — G0424 PULMONARY REHAB W EXER: HCPCS

## 2019-12-12 ENCOUNTER — HOSPITAL ENCOUNTER (OUTPATIENT)
Dept: CARDIAC REHAB | Age: 74
Setting detail: THERAPIES SERIES
Discharge: HOME | End: 2019-12-12
Payer: MEDICARE

## 2019-12-12 PROCEDURE — G0424 PULMONARY REHAB W EXER: HCPCS

## 2019-12-16 ENCOUNTER — HOSPITAL ENCOUNTER (OUTPATIENT)
Dept: CARDIAC REHAB | Age: 74
Setting detail: THERAPIES SERIES
Discharge: HOME | End: 2019-12-16
Payer: MEDICARE

## 2019-12-16 PROCEDURE — G0424 PULMONARY REHAB W EXER: HCPCS

## 2019-12-17 ENCOUNTER — HOSPITAL ENCOUNTER (OUTPATIENT)
Dept: CARDIAC REHAB | Age: 74
Setting detail: THERAPIES SERIES
Discharge: HOME | End: 2019-12-17
Payer: MEDICARE

## 2019-12-17 PROCEDURE — G0424 PULMONARY REHAB W EXER: HCPCS

## 2019-12-23 ENCOUNTER — HOSPITAL ENCOUNTER (OUTPATIENT)
Dept: CARDIAC REHAB | Age: 74
Setting detail: THERAPIES SERIES
Discharge: HOME | End: 2019-12-23
Payer: MEDICARE

## 2019-12-23 PROCEDURE — G0424 PULMONARY REHAB W EXER: HCPCS

## 2019-12-26 ENCOUNTER — HOSPITAL ENCOUNTER (OUTPATIENT)
Dept: CARDIAC REHAB | Age: 74
Setting detail: THERAPIES SERIES
Discharge: HOME | End: 2019-12-26
Payer: MEDICARE

## 2019-12-26 PROCEDURE — G0424 PULMONARY REHAB W EXER: HCPCS

## 2019-12-30 ENCOUNTER — HOSPITAL ENCOUNTER (OUTPATIENT)
Dept: CARDIAC REHAB | Age: 74
Setting detail: THERAPIES SERIES
Discharge: HOME | End: 2019-12-30
Payer: MEDICARE

## 2019-12-30 ENCOUNTER — TELEPHONE (OUTPATIENT)
Dept: CARDIOLOGY CLINIC | Age: 74
End: 2019-12-30

## 2019-12-30 DIAGNOSIS — Z86.79 S/P ABLATION OF ATRIAL FIBRILLATION: ICD-10-CM

## 2019-12-30 DIAGNOSIS — I48.19 PERSISTENT ATRIAL FIBRILLATION (HCC): ICD-10-CM

## 2019-12-30 DIAGNOSIS — Z98.890 S/P ABLATION OF ATRIAL FIBRILLATION: ICD-10-CM

## 2019-12-30 PROCEDURE — G0424 PULMONARY REHAB W EXER: HCPCS

## 2019-12-30 PROCEDURE — 93798 PHYS/QHP OP CAR RHAB W/ECG: CPT

## 2020-01-02 ENCOUNTER — HOSPITAL ENCOUNTER (OUTPATIENT)
Dept: CARDIAC REHAB | Age: 75
Setting detail: THERAPIES SERIES
Discharge: HOME | End: 2020-01-02
Payer: MEDICARE

## 2020-01-02 PROCEDURE — G0424 PULMONARY REHAB W EXER: HCPCS

## 2020-01-06 ENCOUNTER — HOSPITAL ENCOUNTER (OUTPATIENT)
Dept: CARDIAC REHAB | Age: 75
Setting detail: THERAPIES SERIES
Discharge: HOME | End: 2020-01-06
Payer: MEDICARE

## 2020-01-06 PROCEDURE — G0424 PULMONARY REHAB W EXER: HCPCS

## 2020-01-07 ENCOUNTER — HOSPITAL ENCOUNTER (OUTPATIENT)
Dept: CARDIAC REHAB | Age: 75
Setting detail: THERAPIES SERIES
Discharge: HOME | End: 2020-01-07
Payer: MEDICARE

## 2020-01-07 PROCEDURE — G0424 PULMONARY REHAB W EXER: HCPCS

## 2020-01-09 ENCOUNTER — HOSPITAL ENCOUNTER (OUTPATIENT)
Dept: CARDIAC REHAB | Age: 75
Setting detail: THERAPIES SERIES
Discharge: HOME | End: 2020-01-09
Payer: MEDICARE

## 2020-01-09 PROCEDURE — G0424 PULMONARY REHAB W EXER: HCPCS

## 2020-01-13 ENCOUNTER — HOSPITAL ENCOUNTER (OUTPATIENT)
Dept: CARDIAC REHAB | Age: 75
Setting detail: THERAPIES SERIES
Discharge: HOME | End: 2020-01-13
Payer: MEDICARE

## 2020-01-13 PROCEDURE — G0424 PULMONARY REHAB W EXER: HCPCS

## 2020-01-14 ENCOUNTER — HOSPITAL ENCOUNTER (OUTPATIENT)
Dept: CARDIAC REHAB | Age: 75
Setting detail: THERAPIES SERIES
Discharge: HOME | End: 2020-01-14
Payer: MEDICARE

## 2020-01-14 PROCEDURE — G0424 PULMONARY REHAB W EXER: HCPCS

## 2020-01-16 ENCOUNTER — HOSPITAL ENCOUNTER (OUTPATIENT)
Dept: CARDIAC REHAB | Age: 75
Setting detail: THERAPIES SERIES
Discharge: HOME | End: 2020-01-16
Payer: MEDICARE

## 2020-01-16 PROCEDURE — G0424 PULMONARY REHAB W EXER: HCPCS

## 2020-01-20 ENCOUNTER — HOSPITAL ENCOUNTER (OUTPATIENT)
Dept: CARDIAC REHAB | Age: 75
Setting detail: THERAPIES SERIES
Discharge: HOME | End: 2020-01-20
Payer: MEDICARE

## 2020-01-20 PROCEDURE — G0424 PULMONARY REHAB W EXER: HCPCS

## 2020-01-21 ENCOUNTER — HOSPITAL ENCOUNTER (OUTPATIENT)
Dept: CARDIAC REHAB | Age: 75
Setting detail: THERAPIES SERIES
Discharge: HOME | End: 2020-01-21
Payer: MEDICARE

## 2020-01-21 PROCEDURE — G0424 PULMONARY REHAB W EXER: HCPCS

## 2020-01-23 ENCOUNTER — HOSPITAL ENCOUNTER (OUTPATIENT)
Dept: CARDIAC REHAB | Age: 75
Setting detail: THERAPIES SERIES
Discharge: HOME | End: 2020-01-23
Payer: MEDICARE

## 2020-01-23 PROCEDURE — G0424 PULMONARY REHAB W EXER: HCPCS

## 2020-01-27 ENCOUNTER — TELEPHONE (OUTPATIENT)
Dept: CARDIOLOGY CLINIC | Age: 75
End: 2020-01-27

## 2020-01-27 ENCOUNTER — HOSPITAL ENCOUNTER (OUTPATIENT)
Dept: CARDIAC REHAB | Age: 75
Setting detail: THERAPIES SERIES
Discharge: HOME | End: 2020-01-27
Payer: MEDICARE

## 2020-01-27 PROCEDURE — G0424 PULMONARY REHAB W EXER: HCPCS

## 2020-01-30 ENCOUNTER — HOSPITAL ENCOUNTER (OUTPATIENT)
Dept: CARDIAC REHAB | Age: 75
Setting detail: THERAPIES SERIES
Discharge: HOME | End: 2020-01-30
Payer: MEDICARE

## 2020-01-30 PROCEDURE — G0424 PULMONARY REHAB W EXER: HCPCS

## 2020-02-03 ENCOUNTER — HOSPITAL ENCOUNTER (OUTPATIENT)
Dept: CARDIAC REHAB | Age: 75
Setting detail: THERAPIES SERIES
Discharge: HOME | End: 2020-02-03
Payer: MEDICARE

## 2020-02-03 PROCEDURE — G0424 PULMONARY REHAB W EXER: HCPCS

## 2020-02-04 ENCOUNTER — HOSPITAL ENCOUNTER (OUTPATIENT)
Dept: CARDIAC REHAB | Age: 75
Setting detail: THERAPIES SERIES
Discharge: HOME | End: 2020-02-04
Payer: MEDICARE

## 2020-02-04 PROCEDURE — G0424 PULMONARY REHAB W EXER: HCPCS

## 2020-02-06 ENCOUNTER — HOSPITAL ENCOUNTER (OUTPATIENT)
Dept: CARDIAC REHAB | Age: 75
Setting detail: THERAPIES SERIES
Discharge: HOME | End: 2020-02-06
Payer: MEDICARE

## 2020-02-06 PROCEDURE — G0424 PULMONARY REHAB W EXER: HCPCS

## 2020-02-10 ENCOUNTER — HOSPITAL ENCOUNTER (OUTPATIENT)
Dept: CARDIAC REHAB | Age: 75
Setting detail: THERAPIES SERIES
Discharge: HOME | End: 2020-02-10
Payer: MEDICARE

## 2020-02-10 PROCEDURE — G0424 PULMONARY REHAB W EXER: HCPCS

## 2020-02-11 ENCOUNTER — HOSPITAL ENCOUNTER (OUTPATIENT)
Dept: CARDIAC REHAB | Age: 75
Setting detail: THERAPIES SERIES
Discharge: HOME | End: 2020-02-11
Payer: MEDICARE

## 2020-02-11 PROCEDURE — G0424 PULMONARY REHAB W EXER: HCPCS

## 2020-02-17 ENCOUNTER — HOSPITAL ENCOUNTER (OUTPATIENT)
Dept: CARDIAC REHAB | Age: 75
Setting detail: THERAPIES SERIES
Discharge: HOME | End: 2020-02-17
Payer: MEDICARE

## 2020-02-17 PROCEDURE — G0424 PULMONARY REHAB W EXER: HCPCS

## 2020-02-18 ENCOUNTER — HOSPITAL ENCOUNTER (OUTPATIENT)
Dept: CARDIAC REHAB | Age: 75
Setting detail: THERAPIES SERIES
Discharge: HOME | End: 2020-02-18
Payer: MEDICARE

## 2020-02-18 PROCEDURE — G0424 PULMONARY REHAB W EXER: HCPCS

## 2020-02-20 ENCOUNTER — HOSPITAL ENCOUNTER (OUTPATIENT)
Dept: CARDIAC REHAB | Age: 75
Setting detail: THERAPIES SERIES
Discharge: HOME | End: 2020-02-20
Payer: MEDICARE

## 2020-02-20 PROCEDURE — G0424 PULMONARY REHAB W EXER: HCPCS

## 2020-02-24 ENCOUNTER — HOSPITAL ENCOUNTER (OUTPATIENT)
Dept: CARDIAC REHAB | Age: 75
Setting detail: THERAPIES SERIES
Discharge: HOME | End: 2020-02-24
Payer: MEDICARE

## 2020-02-24 PROCEDURE — G0424 PULMONARY REHAB W EXER: HCPCS

## 2020-02-25 ENCOUNTER — HOSPITAL ENCOUNTER (OUTPATIENT)
Dept: CARDIAC REHAB | Age: 75
Setting detail: THERAPIES SERIES
Discharge: HOME | End: 2020-02-25
Payer: MEDICARE

## 2020-02-25 PROCEDURE — G0424 PULMONARY REHAB W EXER: HCPCS

## 2020-03-02 ENCOUNTER — HOSPITAL ENCOUNTER (OUTPATIENT)
Dept: CARDIAC REHAB | Age: 75
Setting detail: THERAPIES SERIES
Discharge: HOME | End: 2020-03-02
Payer: MEDICARE

## 2020-03-02 PROCEDURE — G0424 PULMONARY REHAB W EXER: HCPCS

## 2020-03-05 ENCOUNTER — HOSPITAL ENCOUNTER (OUTPATIENT)
Dept: CARDIAC REHAB | Age: 75
Setting detail: THERAPIES SERIES
Discharge: HOME | End: 2020-03-05
Payer: MEDICARE

## 2020-03-05 PROCEDURE — G0424 PULMONARY REHAB W EXER: HCPCS

## 2020-03-09 ENCOUNTER — HOSPITAL ENCOUNTER (OUTPATIENT)
Dept: CARDIAC REHAB | Age: 75
Setting detail: THERAPIES SERIES
Discharge: HOME | End: 2020-03-09
Payer: MEDICARE

## 2020-03-09 PROCEDURE — G0424 PULMONARY REHAB W EXER: HCPCS

## 2020-03-10 ENCOUNTER — HOSPITAL ENCOUNTER (OUTPATIENT)
Dept: CARDIAC REHAB | Age: 75
Setting detail: THERAPIES SERIES
End: 2020-03-10
Payer: MEDICARE

## 2020-03-17 ENCOUNTER — TELEPHONE (OUTPATIENT)
Dept: CARDIOLOGY CLINIC | Age: 75
End: 2020-03-17

## 2020-03-25 PROBLEM — I48.19 PERSISTENT ATRIAL FIBRILLATION (HCC): Status: RESOLVED | Noted: 2020-03-25 | Resolved: 2020-03-24

## 2020-04-01 ENCOUNTER — TELEPHONE (OUTPATIENT)
Dept: CARDIOLOGY CLINIC | Age: 75
End: 2020-04-01

## 2020-04-15 ENCOUNTER — TELEPHONE (OUTPATIENT)
Dept: CARDIOLOGY CLINIC | Age: 75
End: 2020-04-15

## 2020-05-26 ENCOUNTER — TELEPHONE (OUTPATIENT)
Dept: CARDIOLOGY CLINIC | Age: 75
End: 2020-05-26

## 2020-06-15 RX ORDER — DILTIAZEM HYDROCHLORIDE 360 MG/1
360 CAPSULE, EXTENDED RELEASE ORAL DAILY
Qty: 30 CAPSULE | Refills: 3 | Status: SHIPPED | OUTPATIENT
Start: 2020-06-15

## 2020-06-15 RX ORDER — DILTIAZEM HYDROCHLORIDE 360 MG/1
360 CAPSULE, EXTENDED RELEASE ORAL DAILY
Qty: 30 CAPSULE | Refills: 3 | Status: CANCELLED | OUTPATIENT
Start: 2020-06-15

## 2020-07-06 ENCOUNTER — TELEPHONE (OUTPATIENT)
Dept: CARDIOLOGY CLINIC | Age: 75
End: 2020-07-06

## 2020-07-06 NOTE — TELEPHONE ENCOUNTER
Pt called and needs samples of  eliquis   will  on today   after 2 pm. Next diane is  8/20  Pt is out.  Ok to leave message

## 2020-07-08 ENCOUNTER — TELEPHONE (OUTPATIENT)
Dept: CARDIOLOGY CLINIC | Age: 75
End: 2020-07-08

## 2020-07-08 PROBLEM — I71.02 DISSECTION OF ABDOMINAL AORTA (HCC): Status: ACTIVE | Noted: 2020-07-08

## 2020-07-08 PROBLEM — I10 ESSENTIAL HYPERTENSION: Status: ACTIVE | Noted: 2020-07-08

## 2020-07-13 ENCOUNTER — TELEPHONE (OUTPATIENT)
Dept: CARDIOLOGY CLINIC | Age: 75
End: 2020-07-13

## 2020-07-13 ENCOUNTER — OFFICE VISIT (OUTPATIENT)
Dept: CARDIOLOGY CLINIC | Age: 75
End: 2020-07-13
Payer: MEDICARE

## 2020-07-13 ENCOUNTER — HOSPITAL ENCOUNTER (OUTPATIENT)
Dept: LAB | Age: 75
Discharge: HOME | End: 2020-07-13
Payer: MEDICARE

## 2020-07-13 VITALS
WEIGHT: 180.6 LBS | SYSTOLIC BLOOD PRESSURE: 124 MMHG | BODY MASS INDEX: 29.02 KG/M2 | HEIGHT: 66 IN | DIASTOLIC BLOOD PRESSURE: 82 MMHG | HEART RATE: 74 BPM

## 2020-07-13 PROCEDURE — 93000 ELECTROCARDIOGRAM COMPLETE: CPT | Performed by: INTERNAL MEDICINE

## 2020-07-13 PROCEDURE — U0002 COVID-19 LAB TEST NON-CDC: HCPCS

## 2020-07-13 PROCEDURE — 99214 OFFICE O/P EST MOD 30 MIN: CPT | Performed by: INTERNAL MEDICINE

## 2020-07-13 NOTE — LETTER
Sree Capps  1945  [id number]    Have you had any Chest Pain - No      Have you had any Shortness of Breath - Yes  If Yes - When on exertion    Have you had any dizziness - No    Have you had any palpitations - No      Do you have any edema - nO    Do you have a surgery or procedure scheduled in the near future - No

## 2020-07-13 NOTE — TELEPHONE ENCOUNTER
Pt here in office & educated on ROBB for Dx: Aortic Stenosis, scheduled for 7/15/20 @ 12 PM, w/arrival @ 11 AM, @ [facility]; risks explained; & consents signed. Instructions given to pt to:  NPO after midnight night before procedure; Call hospital @ [phone] to pre-register; May take rest of morning meds. am of procedure; & pt voiced understanding. Copies of consent, pre-testing orders, & info. sheet scanned into chart.

## 2020-07-13 NOTE — PROGRESS NOTES
CLASS I  1. Treatment of hypertension (systolic BP >866 mm Hg) is recommended  in patients with chronic AR (stages B and C), preferably  with dihydropyridine calcium channel blockers or ACE  inhibitors/ARBs (204,209). (Level of Evidence: B)  Vasodilating drugs are effective in reducing systolic BP in  patients with chronic AR. Beta blockers may be less effective  because the reduction in heart rate is associated with an even  higher stroke volume, which contributes to the elevated  systolic pressure in patients with chronic severe AR. Supporting References: [phone])  CLASS IIa  1.  Medical therapy with ACE inhibitors/ARBs and beta blockers  is reasonable in patients with severe AR who have symptoms  and/or LV dysfunction (stages C2 and D) when surgery is  not performed because of comorbidities (

## 2020-07-13 NOTE — PROGRESS NOTES
CARDIOLOGY NOTE      7/13/2020    RE: Lorenzo Braswell  (1945)                               TO:  Dr. Thalia Jc MD            CHIEF COMPLAINT   Jan Brewster is a 67 y.o. male who was seen today for management of  VHD                                    HPI:   Patient is here for    - VHD  AR  - Hypertension,is  well controlled, pt is  compliant with medicines  - Hyperlipidimea, lipids are in acceptable range. Pt  is  compliant with medicines  - Hyperlipidimea, lipids are in acceptable range. Pt  is  compliant with medicines  - Atrial fibrillation, pt is  compliant with meds. Patient does not have symptoms from atrial fibrillation                  The patient has cardiac complaints of  Moderate SOB for  afew months, saw CTS referred for VHD. For severe AR.     Lorenzo Braswell has the following history recorded in care path:  Patient Active Problem List    Diagnosis Date Noted    Dissection of abdominal aorta ([address] 07/08/2020    Essential hypertension 07/08/2020    Chronic respiratory failure ([address] 08/19/2019    Moderate COPD (chronic obstructive pulmonary disease) ([address] 08/19/2019    Bronchospasm 05/22/2019    Pulmonary hypertension ([address] 05/22/2019    Nonrheumatic aortic valve insufficiency 04/24/2019    Ascending aortic aneurysm ([address] 04/24/2019    Pneumonia of both lungs due to infectious organism 04/24/2019    Atrial tachycardia ([address]     Atrial fibrillation ([address] 04/16/2019    S/P ablation of atrial fibrillation     Persistent atrial fibrillation 05/31/2016    History of atrial fibrillation     Hyperlipidemia     Aneurysm ([address]     [facility] 04/16/2012     Current Outpatient Medications   Medication Sig Dispense Refill    umeclidinium-vilanterol (ANORO ELLIPTA) 62.5-25 MCG/INH AEPB inhaler Inhale 1 puff into the lungs daily 1 each 5    apixaban (ELIQUIS) 5 MG TABS tablet Take 1 tablet by mouth 2 times daily 28 tablet 0    dilTIAZem (CARDIZEM CD) 360 MG extended release capsule Take 1 capsule by mouth daily 30 capsule 3    apixaban (ELIQUIS) 5 MG TABS tablet Take one tablet twice daily 60 tablet 3    OXYGEN Inhale 2 L into the lungs continuous      metoprolol tartrate (LOPRESSOR) 25 MG tablet Take 12.5 mg by mouth 2 times daily      budesonide-formoterol (SYMBICORT) 80-4.5 MCG/ACT AERO Inhale 2 puffs into the lungs 2 times daily 1 Inhaler 5    pravastatin (PRAVACHOL) 20 MG tablet TAKE ONE (1) TABLET BY MOUTH ONCE DAILY 90 tablet 3    amiodarone (CORDARONE) 200 MG tablet Take 1 tablet by mouth daily 30 tablet 3    furosemide (LASIX) 20 MG tablet Take 1 tablet by mouth daily 60 tablet 3    lactobacillus (CULTURELLE) capsule Take 1 capsule by mouth daily (with breakfast) 30 capsule 0    potassium chloride (KLOR-CON M) 10 MEQ extended release tablet Take 1 tablet by mouth daily 60 tablet 3    traMADol-acetaminophen (ULTRACET) 37.5-325 MG per tablet 1 tablet every 4 hours as needed       omeprazole (PRILOSEC) 40 MG capsule Take 40 mg by mouth daily      ferrous sulfate 325 (65 FE) MG tablet Take 325 mg by mouth daily (with breakfast)      gabapentin (NEURONTIN) 100 MG capsule Take 100 mg by mouth nightly as needed       UNABLE TO FIND Venancio B-150      diclofenac sodium 1 % GEL Apply 2 g topically daily 04/16/19 Applies to lower back      multivitamin (THERAGRAN) per tablet Take 1 tablet by mouth daily.  vitamin E 400 UNIT capsule Take 400 Units by mouth daily.  vitamin B-12 (CYANOCOBALAMIN) 100 MCG tablet Take 50 mcg by mouth daily.  celecoxib (CELEBREX) 200 MG capsule Take 200 mg by mouth 2 times daily.  levothyroxine (SYNTHROID) 100 MCG tablet Take 100 mcg by mouth daily.  aspirin 81 MG chewable tablet Take 81 mg by mouth daily. No current facility-administered medications for this visit.       Allergies: Codeine and Vicodin [hydrocodone-acetaminophen]  Past Medical History:   Diagnosis Date    Aneurysm Lab Results   Component Value Date    INR 1.79 04/16/2019     No results found for: LABA1C  Lab Results   Component Value Date    WBC 7.7 11/18/2019    HCT 46.7 11/18/2019    .9 (H) 11/18/2019     11/18/2019     Lab Results   Component Value Date    CHOL 101 04/17/2019    TRIG 66 04/17/2019    HDL 48 04/17/2019    LDLCALC 50 04/09/2019    LDLDIRECT 52 04/17/2019     Lab Results   Component Value Date    ALT 14 04/16/2019    AST 28 04/16/2019     BMP:    Lab Results   Component Value Date     04/21/2019    K 4.0 04/21/2019     04/21/2019    CO2 23 04/21/2019    BUN 31 04/21/2019    CREATININE 0.9 04/21/2019     CMP:   Lab Results   Component Value Date     04/21/2019    K 4.0 04/21/2019     04/21/2019    CO2 23 04/21/2019    BUN 31 04/21/2019    PROT 7.2 04/16/2019    PROT 7.3 06/23/2012     TSH:    Lab Results   Component Value Date    TSH 0.722 04/09/2019       QUALITY MEASURES REVIEWED:  Impression:    1. Persistent atrial fibrillation       Patient Active Problem List   Diagnosis Code    ICH (intracerebral hemorrhage) ([facility] I61.9    History of atrial fibrillation Z86.79    Hyperlipidemia E78.5    [facility] I72.[facility]    Persistent atrial fibrillation I48.19    S/P ablation of atrial fibrillation Z98.890, Z86.79    Atrial fibrillation ([facility]) I48.91    Atrial tachycardia ([facility]) I47.1    Nonrheumatic aortic valve insufficiency I35.1    Ascending aortic aneurysm ([facility]) I71.2    Pneumonia of both lungs due to infectious organism J18.9    Bronchospasm J98.01    Pulmonary hypertension ([facility]) I27.20    Chronic respiratory failure ([facility]) J96.10    Moderate COPD (chronic obstructive pulmonary disease) ([facility]) J44.9    Dissection of abdominal aorta ([facility]) I71.02    Essential hypertension I10       Assessment & Plan:    - VHD  hasevere AR, ROBB    -  Hypertension: Patients blood pressure is normal. Patient is advised about low sodium diet.  Present medical regimen will not be changed. - Atrial fibrillation, pt is  compliant with meds. Patient does not have symptoms from atrial fibrillation              -  LIPID MANAGEMENT:  Available lipid  lab data reviewed  and patient was given dietary advice. NCEP- ATP III guidelines reviewed with patient. -   Changes  in medicines made:  Marisol Smith MD    [facility] [place]

## 2020-07-15 ENCOUNTER — HOSPITAL ENCOUNTER (OUTPATIENT)
Dept: NON INVASIVE DIAGNOSTICS | Age: 75
Discharge: HOME | End: 2020-07-15
Payer: MEDICARE

## 2020-07-15 VITALS
DIASTOLIC BLOOD PRESSURE: 59 MMHG | SYSTOLIC BLOOD PRESSURE: 149 MMHG | HEART RATE: 73 BPM | OXYGEN SATURATION: 100 % | RESPIRATION RATE: 16 BRPM

## 2020-07-15 PROCEDURE — 7100000001 HC PACU RECOVERY - ADDTL 15 MIN

## 2020-07-15 PROCEDURE — 93312 ECHO TRANSESOPHAGEAL: CPT

## 2020-07-15 PROCEDURE — 7100000000 HC PACU RECOVERY - FIRST 15 MIN

## 2020-07-16 PROCEDURE — 93312 ECHO TRANSESOPHAGEAL: CPT | Performed by: INTERNAL MEDICINE

## 2020-07-17 LAB
SARS-COV-2: NOT DETECTED
SOURCE: NORMAL

## 2020-07-30 ENCOUNTER — VIRTUAL VISIT (OUTPATIENT)
Dept: CARDIOLOGY CLINIC | Age: 75
End: 2020-07-30
Payer: MEDICARE

## 2020-07-30 PROCEDURE — 99442 PR PHYS/QHP TELEPHONE EVALUATION 11-20 MIN: CPT | Performed by: INTERNAL MEDICINE

## 2020-07-30 NOTE — PROGRESS NOTES
CARDIOLOGY NOTE      7/30/2020    RE: Kathe Velazquez  ([birth year])                               TO:  Dr. Neal Mcmullen MD            CHIEF COMPLAINT   Afia Pascual is a 67 y.o. male who was seen today for management of  VHD                                    HPI:   Patient is here for    - VHD  Mod AR  - Atrial fibrillation, pt is  compliant with meds. Patient does not have symptoms from atrial fibrillation  - Hypertension,is  well controlled, pt is  compliant with medicines  - Hyperlipidimea, lipids are in acceptable range.  Pt  is  compliant with medicines                  The patient does not have cardiac complaints    Kathe Velazquez has the following history recorded in care path:  Patient Active Problem List    Diagnosis Date Noted    VHD (valvular heart disease)     Dissection of abdominal aorta ([address] 07/08/2020    Essential hypertension 07/08/2020    Chronic respiratory failure ([address] 08/19/2019    Moderate COPD (chronic obstructive pulmonary disease) ([address] 08/19/2019    Bronchospasm 05/22/2019    Pulmonary hypertension ([address] 05/22/2019    Nonrheumatic aortic valve insufficiency 04/24/2019    Ascending aortic aneurysm ([address] 04/24/2019    Pneumonia of both lungs due to infectious organism 04/24/2019    Atrial tachycardia ([address]     Atrial fibrillation ([address] 04/16/2019    S/P ablation of atrial fibrillation     Persistent atrial fibrillation 05/31/2016    History of atrial fibrillation     Hyperlipidemia     Aneurysm ([address]     ICH (intracerebral hemorrhage) (HCC) 04/16/2012     Current Outpatient Medications   Medication Sig Dispense Refill    umeclidinium-vilanterol (ANORO ELLIPTA) 62.5-25 MCG/INH AEPB inhaler Inhale 1 puff into the lungs daily 1 each 5    dilTIAZem (CARDIZEM CD) 360 MG extended release capsule Take 1 capsule by mouth daily 30 capsule 3    apixaban (ELIQUIS) 5 MG TABS tablet Take one tablet twice daily 60 tablet 3    OXYGEN Inhale 2 L into the lungs reflux (GERD)     H/O prior ablation treatment 08/16/2016    History of atrial fibrillation     History of echocardiogram 11/18/2019    EF 90-18%, Grade I diastolic dysfunction, Mild concentric left ventricular hypertrophy, Dilatation of arotic root 4.4 cm, Mild AS, Limited mobility of posterior leaflet of mitral valve, Mod AR, Mild MR, TR, mild-mod Pulm HTN, no pericardial effusion     History of transesophageal echocardiography (ROBB) 07/15/2020    Mod AS & AR,  Mildly dilated ascending aorta. 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-    PARS PLANA VITRECTOMY LEFT EYE    Thyroid disease     HYPOTHYROIDISM     Past Surgical History:   Procedure Laterality Date    ABLATION OF DYSRHYTHMIC FOCUS      ATRIAL ABLATION SURGERY N/A 08/16/2016    atrial fibrillation ablation    BRAIN ANEURYSM SURGERY      has coil    CATARACT REMOVAL  2012    HERNIA REPAIR      LEFT    KNEE SURGERY  1977    knee  left    KNEE SURGERY      VITRECTOMY  05-    LEFT EYE       As reviewed   Family History   Problem Relation Age of Onset    High Blood Pressure Mother      Social History     Tobacco Use    Smoking status: Never Smoker    Smokeless tobacco: Never Used    Tobacco comment: reviewed   Substance Use Topics    Alcohol use: No      Review of Systems:    Constitutional: Negative for diaphoresis and fatigue  Psychological:Negative for anxiety or depression  HENT: Negative for headaches, nasal congestion, sinus pain or vertigo  Eyes: Negative for visual disturbance.    Endocrine: Negative for polydipsia/polyuria  Respiratory: Negative for shortness of breath  Cardiovascular: Negative for chest pain, dyspnea on exertion, claudication, edema, irregular heartbeat, murmur, palpitations or shortness of breath  Gastrointestinal: Negative for abdominal pain or heartburn  Genito-Urinary: Negative for urinary frequency/urgency  Musculoskeletal: Negative for muscle pain, muscular weakness, negative for pain in arm and leg or swelling in foot and leg  Neurological: Negative for dizziness, headaches, memory loss, numbness/tingling, visual changes, syncope  Dermatological: Negative for rash    Objective: There were no vitals filed for this visit. There were no vitals taken for this visit. Patient-Reported Vitals 7/30/2020   Patient-Reported Weight 180   Patient-Reported Height 5 6   Patient-Reported Systolic 108   Patient-Reported Diastolic 79   Patient-Reported Pulse 64        Wt Readings from Last 3 Encounters:   07/13/20 180 lb 9.6 oz (81.9 kg)   07/07/20 180 lb (81.6 kg)   07/06/20 180 lb (81.6 kg)     There is no height or weight on file to calculate BMI. GENERAL - Alert, oriented, pleasant, in no apparent distress. Lab Review   Lab Results   Component Value Date    TROPONINT <0.010 04/17/2019     BNP:  No results found for: BNP  PT/INR:    Lab Results   Component Value Date    INR 1.79 04/16/2019     No results found for: LABA1C  Lab Results   Component Value Date    WBC 7.7 11/18/2019    HCT 46.7 11/18/2019    .9 (H) 11/18/2019     11/18/2019     Lab Results   Component Value Date    CHOL 101 04/17/2019    TRIG 66 04/17/2019    HDL 48 04/17/2019    LDLCALC 50 04/09/2019    LDLDIRECT 52 04/17/2019     Lab Results   Component Value Date    ALT 14 04/16/2019    AST 28 04/16/2019     BMP:    Lab Results   Component Value Date     04/21/2019    K 4.0 04/21/2019     04/21/2019    CO2 23 04/21/2019    BUN 31 04/21/2019    CREATININE 0.9 04/21/2019     CMP:   Lab Results   Component Value Date     04/21/2019    K 4.0 04/21/2019     04/21/2019    CO2 23 04/21/2019    BUN 31 04/21/2019    PROT 7.2 04/16/2019    PROT 7.3 06/23/2012     TSH:    Lab Results   Component Value Date    TSH 0.722 04/09/2019         Impression:    No diagnosis found.    Patient Active Problem List Diagnosis Code    ICH (intracerebral hemorrhage) ([facility] I61.9    History of atrial fibrillation Z86.79    Hyperlipidemia E78.5    Aneurysm (HCC) I72.9    Persistent atrial fibrillation I48.19    S/P ablation of atrial fibrillation Z98.890, Z86.79    Atrial fibrillation (HCC) I48.91    Atrial tachycardia (HCC) I47.1    Nonrheumatic aortic valve insufficiency I35.1    Ascending aortic aneurysm (HCC) I71.2    Pneumonia of both lungs due to infectious organism J18.9    Bronchospasm J98.01    Pulmonary hypertension (HCC) I27.20    Chronic respiratory failure (HCC) J96.10    Moderate COPD (chronic obstructive pulmonary disease) (HCC) J44.9    Dissection of abdominal aorta (HCC) I71.02    Essential hypertension I10    VHD (valvular heart disease) I38       Assessment & Plan:    - VHD  Mod AR  6 monthly FU, ROBB Dw pt    - has Desc aortic dissection    -  Hypertension: Patients blood pressure is normal. Patient is advised about low sodium diet. Present medical regimen will not be changed. -  LIPID MANAGEMENT:  Available lipid  lab data reviewed  and patient was given dietary advice. NCEP- ATP III guidelines reviewed with patient. -   Changes  in medicines made: No                           - Atrial fibrillation, pt is  compliant with meds. Patient does not have symptoms from atrial fibrillation         I confirm that this visit was completed in a telehealth setting ,using synchronous audiotechnology for real time patient interaction . The patient identity with name and date of birth was confirmed . This evaluation of patient was done by telehealth in the setting of [address] emergency , which precluded assurance of safe in person visit at the time of service. The patient consented to and accepts potential risks associated with telemedical evaluation and care was taken to assess paulette presence of any medical issues that would be more  appropriate for expedited in -person care. Pursuant to the emergency declaration under the [address], On license of [facility] waiver authority and the [employer] and [employer] Act, this Virtual  Visit was conducted, with patient's consent, to reduce the patient's risk of exposure to COVID-19 and provide continuity of care for an established patient. Services were provided through a  discussion on pphone to substitute for in-person clinic visit. TELEPHONE WAS USED TO COMMUNICATE WITH PT    I Confirm this is a Patient Initiated Episode with an Established Patient who has not had a related appointment within my department in the past 7 days or scheduled within the next 24 hours. The call was not tied to face to face office visit or procedure that has occurred in in prior 7 days.   Based on our conversation /evaluation , a subsequent office visit for the patient's problem is not indicated for  24 hrs      Time spent; 10 m  Location; [facility], [place], [address], [address] [facility]  Office staff dionicio SOTO were utilised                      Bruce Smith MD    [facility]

## 2020-07-30 NOTE — PROGRESS NOTES
[id number] Score for Atrial Fibrillation Stroke Risk   Risk   Factors  Component Value   C CHF Yes 1   H HTN Yes 1   A2 Age >= 76 Yes,  (76 y.o.) 2   D DM No 0   S2 Prior Stroke/TIA No 0   V Vascular Disease No 0   A Age 74-69 No,  (76 y.o.) 0   Sc Sex male 0    [id number]  Score  4   Score last updated 7/30/20 45:59 AM EDT    Click here for a link to the UpToDate guideline \"Atrial Fibrillation: Anticoagulation therapy to prevent embolization    Disclaimer: Risk Score calculation is dependent on accuracy of patient problem list and past encounter diagnosis. Jonathan hunt

## 2020-08-27 ENCOUNTER — TELEPHONE (OUTPATIENT)
Dept: CARDIOLOGY CLINIC | Age: 75
End: 2020-08-27

## 2020-10-08 NOTE — TELEPHONE ENCOUNTER
Patient called requesting samples of Eliquis, I advised patient that if available samples should be ready for  by tomorrow afternoon, please call with any problems at ph# [phone].

## 2020-10-20 ENCOUNTER — TELEPHONE (OUTPATIENT)
Dept: CARDIOLOGY CLINIC | Age: 75
End: 2020-10-20

## 2020-10-20 NOTE — TELEPHONE ENCOUNTER
Pt called, said he paid with an electronic check on 7/13/2020 for $30.00- but he said the bank acct hasn't been deducted. I told him that the acct was credited and he doesn't have a balance. and I didn't know who to ask about a pymt not been credited.

## 2021-01-04 DIAGNOSIS — I48.19 PERSISTENT ATRIAL FIBRILLATION (HCC): ICD-10-CM

## 2021-01-04 DIAGNOSIS — Z86.79 S/P ABLATION OF ATRIAL FIBRILLATION: ICD-10-CM

## 2021-01-04 DIAGNOSIS — Z98.890 S/P ABLATION OF ATRIAL FIBRILLATION: ICD-10-CM

## 2021-01-27 DIAGNOSIS — Z86.79 S/P ABLATION OF ATRIAL FIBRILLATION: ICD-10-CM

## 2021-01-27 DIAGNOSIS — Z98.890 S/P ABLATION OF ATRIAL FIBRILLATION: ICD-10-CM

## 2021-01-27 DIAGNOSIS — I48.19 PERSISTENT ATRIAL FIBRILLATION (HCC): ICD-10-CM
